# Patient Record
Sex: MALE | Race: WHITE | Employment: FULL TIME | URBAN - NONMETROPOLITAN AREA
[De-identification: names, ages, dates, MRNs, and addresses within clinical notes are randomized per-mention and may not be internally consistent; named-entity substitution may affect disease eponyms.]

---

## 2017-02-22 ENCOUNTER — APPOINTMENT (OUTPATIENT)
Dept: OCCUPATIONAL MEDICINE | Facility: OTHER | Age: 42
End: 2017-02-22

## 2017-02-22 PROCEDURE — 99000 SPECIMEN HANDLING OFFICE-LAB: CPT

## 2018-01-30 ENCOUNTER — HOSPITAL ENCOUNTER (EMERGENCY)
Facility: HOSPITAL | Age: 43
Discharge: HOME | End: 2018-01-30
Attending: PHYSICIAN ASSISTANT | Admitting: PHYSICIAN ASSISTANT
Payer: COMMERCIAL

## 2018-01-30 VITALS
HEART RATE: 83 BPM | SYSTOLIC BLOOD PRESSURE: 131 MMHG | TEMPERATURE: 98.1 F | DIASTOLIC BLOOD PRESSURE: 75 MMHG | OXYGEN SATURATION: 96 % | RESPIRATION RATE: 16 BRPM

## 2018-01-30 DIAGNOSIS — G40.909 NONINTRACTABLE EPILEPSY WITHOUT STATUS EPILEPTICUS, UNSPECIFIED EPILEPSY TYPE (H): ICD-10-CM

## 2018-01-30 LAB
ANION GAP SERPL CALCULATED.3IONS-SCNC: 13 MMOL/L (ref 3–14)
BASOPHILS # BLD AUTO: 0.1 10E9/L (ref 0–0.2)
BASOPHILS NFR BLD AUTO: 0.6 %
BUN SERPL-MCNC: 18 MG/DL (ref 7–30)
CALCIUM SERPL-MCNC: 8.3 MG/DL (ref 8.5–10.1)
CHLORIDE SERPL-SCNC: 102 MMOL/L (ref 94–109)
CO2 SERPL-SCNC: 26 MMOL/L (ref 20–32)
CREAT SERPL-MCNC: 0.78 MG/DL (ref 0.66–1.25)
DIFFERENTIAL METHOD BLD: ABNORMAL
EOSINOPHIL # BLD AUTO: 0 10E9/L (ref 0–0.7)
EOSINOPHIL NFR BLD AUTO: 0.1 %
ERYTHROCYTE [DISTWIDTH] IN BLOOD BY AUTOMATED COUNT: 14.4 % (ref 10–15)
GFR SERPL CREATININE-BSD FRML MDRD: >90 ML/MIN/1.7M2
GLUCOSE SERPL-MCNC: 159 MG/DL (ref 70–99)
HCT VFR BLD AUTO: 37.7 % (ref 40–53)
HGB BLD-MCNC: 13.2 G/DL (ref 13.3–17.7)
IMM GRANULOCYTES # BLD: 0 10E9/L (ref 0–0.4)
IMM GRANULOCYTES NFR BLD: 0.4 %
LYMPHOCYTES # BLD AUTO: 1.3 10E9/L (ref 0.8–5.3)
LYMPHOCYTES NFR BLD AUTO: 13.7 %
MCH RBC QN AUTO: 32.1 PG (ref 26.5–33)
MCHC RBC AUTO-ENTMCNC: 35 G/DL (ref 31.5–36.5)
MCV RBC AUTO: 92 FL (ref 78–100)
MONOCYTES # BLD AUTO: 0.6 10E9/L (ref 0–1.3)
MONOCYTES NFR BLD AUTO: 5.9 %
NEUTROPHILS # BLD AUTO: 7.8 10E9/L (ref 1.6–8.3)
NEUTROPHILS NFR BLD AUTO: 79.3 %
NRBC # BLD AUTO: 0 10*3/UL
NRBC BLD AUTO-RTO: 0 /100
PLATELET # BLD AUTO: 219 10E9/L (ref 150–450)
POTASSIUM SERPL-SCNC: 3.7 MMOL/L (ref 3.4–5.3)
RBC # BLD AUTO: 4.11 10E12/L (ref 4.4–5.9)
SODIUM SERPL-SCNC: 141 MMOL/L (ref 133–144)
WBC # BLD AUTO: 9.8 10E9/L (ref 4–11)

## 2018-01-30 PROCEDURE — 80048 BASIC METABOLIC PNL TOTAL CA: CPT | Performed by: PHYSICIAN ASSISTANT

## 2018-01-30 PROCEDURE — 99284 EMERGENCY DEPT VISIT MOD MDM: CPT | Mod: 25

## 2018-01-30 PROCEDURE — 85025 COMPLETE CBC W/AUTO DIFF WBC: CPT | Performed by: PHYSICIAN ASSISTANT

## 2018-01-30 PROCEDURE — 36415 COLL VENOUS BLD VENIPUNCTURE: CPT | Performed by: PHYSICIAN ASSISTANT

## 2018-01-30 PROCEDURE — 99284 EMERGENCY DEPT VISIT MOD MDM: CPT | Performed by: PHYSICIAN ASSISTANT

## 2018-01-30 PROCEDURE — 25000128 H RX IP 250 OP 636: Performed by: PHYSICIAN ASSISTANT

## 2018-01-30 PROCEDURE — 96374 THER/PROPH/DIAG INJ IV PUSH: CPT

## 2018-01-30 PROCEDURE — 80177 DRUG SCRN QUAN LEVETIRACETAM: CPT

## 2018-01-30 PROCEDURE — 25000132 ZZH RX MED GY IP 250 OP 250 PS 637: Performed by: PHYSICIAN ASSISTANT

## 2018-01-30 RX ORDER — LORAZEPAM 2 MG/ML
1 INJECTION INTRAMUSCULAR ONCE
Status: COMPLETED | OUTPATIENT
Start: 2018-01-30 | End: 2018-01-30

## 2018-01-30 RX ORDER — LEVETIRACETAM 500 MG/1
1000 TABLET ORAL ONCE
Status: COMPLETED | OUTPATIENT
Start: 2018-01-30 | End: 2018-01-30

## 2018-01-30 RX ADMIN — LEVETIRACETAM 1000 MG: 500 TABLET ORAL at 19:20

## 2018-01-30 RX ADMIN — LORAZEPAM 1 MG: 2 INJECTION INTRAMUSCULAR; INTRAVENOUS at 19:22

## 2018-01-30 NOTE — ED AVS SNAPSHOT
[facility]    [address] [address]    Phone:  [phone]                                       Jarett Duenas   MRN: [id number]    Department:  [facility]   Date of Visit:  1/30/2018           After Visit Summary Signature Page     I have received my discharge instructions, and my questions have been answered. I have discussed any challenges I see with this plan with the nurse or doctor.    ..........................................................................................................................................  Patient/Patient Representative Signature      ..........................................................................................................................................  Patient Representative Print Name and Relationship to Patient    ..................................................               ................................................  Date                                            Time    ..........................................................................................................................................  Reviewed by Signature/Title    ...................................................              ..............................................  Date                                                            Time

## 2018-01-30 NOTE — ED AVS SNAPSHOT
[facility]    [address] [address]    Phone:  [phone]                                       Jarett Duenas   MRN: [id number]    Department[facility]  [facility]   Date of Visit:  1/30/2018           Patient Information     Date Of Birth          1975        Your diagnoses for this visit were:     Nonintractable epilepsy without status epilepticus, unspecified epilepsy type (H)        You were seen by Jennifer Matute PA-C.      Follow-up Information     Follow up with Venancio Alejo DO In 2 days.    Specialty:  Family Practice    Contact information:    [facility]  [address] [facility] [address] [phone]          Follow up with [facility].    Specialty:  EMERGENCY MEDICINE    Why:  If symptoms worsen    Contact information:    [address] [address] [phone]    Additional information:    From [place]: Take [address]. Turn left at [address]. Turn left at the first stoplight on [address]. At the first stop sign, take a right onto [address]. Take a left into the parking lot and continue through until you reach the North enterance of the building.       From [place]: Take [address]. Take the [address] ramp towards [place]. Turn left onto [address]. Take a slight right onto [address]. Turn left at the first stoplight on [address]. At the first stop sign, take a right onto [address]. Take a left into the parking lot and continue through until you reach the North enterance of the building.       From [place]: Take [address]. Take a right at [address]. At the first stop sign, take a right onto [address]. Take a left into the parking lot and continue through until you reach the North enterance of the building.         Discharge Instructions       Increase your Keppra to twice daily. Follow up  with Dr. Alejo this week for re-check. Please return here sooner with any new or worsening symptoms.         Discharge Instructions for Epilepsy  You have been diagnosed with epilepsy, a disorder of recurring seizures. When you have a seizure, an electrical disturbance happens in your brain. There are different kinds of seizures, and each patient may have one or many types of seizures. Here are some guidelines for you and your family.  If you have a seizure  Ask friends and family members to learn seizure management. Also, tell them to do the following if you have a seizure:    Clear the area to prevent injury.    Position you on a flat, carpeted surface, if possible.    Don t try to restrain you.    Don t put anything in your mouth.    Turn you onto your side if you start to vomit.    Keep track of the date and time the seizure started, how long it lasted, whether or not you lost consciousness, a description of your body movements, what provoked the seizure (if known), and any injuries you suffered. Using a watch may help keep correct time of events.      Stay with you until you regain consciousness.    Call 911 if the seizure is longer than 5 minutes, if there are multiple seizures, or if you do not begin to wake up after the seizure stops.    Activities  Following are some things to consider:    Enjoy your normal activities. Most people with epilepsy lead normal lives.    Avoid hazardous activities, such as mountain climbing or scuba diving. A seizure under these conditions could lead to a fatal accident.    Do not swim alone or participate in other similar activities without others nearby.    Ask your healthcare provider about any restrictions on driving or other activities.    Check with your state department of public safety to learn whether there are any driving limitations based on your condition.  Other home care  Other considerations:    Take your medicine exactly as directed. Skipping doses can affect  the way your body handles the medicine, which could cause you to have a seizure.    Don t drink alcohol or use any medicine without talking with your healthcare provider first.    Seizure medicines may interact with other medicines. Make sure all of your healthcare providers have a list of all your medicines.     Birth control pills may not work as effectively when taking seizure medicines. Ask your healthcare provider if a change in birth control is needed.     Wear a medical alert pendant or bracelet that alerts others to your condition, especially if you are allergic to seizure medicine.    Join a local support group. Ask your healthcare provider for names and phone numbers.  .  When to seek medical attention  Tell your family members or friends to call 911 right away if you have:    Seizure that lasts more than 5 minutes    Multiple seizures in a row    No recovery of consciousness after the seizure stops  Otherwise, have them call your healthcare provider right away if you have:    Seizures that are getting longer and worse    Seizures that are different from those you ve had in the past    Seizures strong enough to cause injury    Skin rash    Fever of 100.4 F (38 C) or higher, or as directed by your healthcare provider   Date Last Reviewed: 11/5/2015 2000-2017 [employer]. [address] [place] [address]. All rights reserved. This information is not intended as a substitute for professional medical care. Always follow your healthcare professional's instructions.                 Review of your medicines      Our records show that you are taking the medicines listed below. If these are incorrect, please call your family doctor or clinic.        Dose / Directions Last dose taken    ferrous sulfate 325 (65 FE) MG tablet   Commonly known as:  IRON SUPPLEMENT   Dose:  325 mg   Quantity:  100 tablet        Take 1 tablet (325 mg) by mouth 3 times daily (with meals)   Refills:  0         "IBUPROFEN PO        Take by mouth every 6 hours   Refills:  0        KEPPRA PO        Take by mouth daily   Refills:  0                Procedures and tests performed during your visit     Basic metabolic panel    CBC with platelets differential    Keppra (Levetiracetam) Level    Peripheral IV catheter      Orders Needing Specimen Collection     None      Pending Results     Date and Time Order Name Status Description    2018 1906 Keppra (Levetiracetam) Level In process             Pending Culture Results     No orders found from 2018 to 2018.            Thank you for choosing [facility]       Thank you for choosing [facility] for your care. Our goal is always to provide you with excellent care. Hearing back from our patients is one way we can continue to improve our services. Please take a few minutes to complete the written survey that you may receive in the mail after you visit with us. Thank you!        [employer] Information     [employer] lets you send messages to your doctor, view your test results, renew your prescriptions, schedule appointments and more. To sign up, go to [email or web address] . Click on \"Log in\" on the left side of the screen, which will take you to the Welcome page. Then click on \"Sign up Now\" on the right side of the page.     You will be asked to enter the access code listed below, as well as some personal information. Please follow the directions to create your username and password.     Your access code is: [email or web address]  Expires: 2018  8:42 PM     Your access code will  in 90 days. If you need help or a new code, please call your [facility] clinic or [phone].        Care EveryWhere ID     This is your Care EveryWhere ID. This could be used by other organizations to access your [facility] medical records  [id number]        Equal Access to Services     JEANNETTE GRAHAM AH: lula Haque, tram brown " margo andrade ah. So [facility] [phone].    ATENCIÓN: Si habla español, tiene a palomares disposición servicios gratuitos de asistencia lingüística. Llame al [phone].    We comply with applicable federal civil rights laws and [place] laws. We do not discriminate on the basis of race, color, national origin, age, disability, sex, sexual orientation, or gender identity.            After Visit Summary       This is your record. Keep this with you and show to your community pharmacist(s) and doctor(s) at your next visit.

## 2018-01-31 NOTE — ED PROVIDER NOTES
History     Chief Complaint   Patient presents with     Seizures     HPI  Jarett Duenas is a 42 year old male with h/o seizures who presents for evaluation following a seizure while at work. He recalls waking up on the ground and assumed he had a seizure as he couldn't recall how he ended up on the floor. Denies any injuries or loss or bowel/bladder. He has been on Keppra since his first seizure last summer. His dose was recently decreased to once per day by his PCP as he was having vivid dreams. He is unsure what dose he is taking currently. He forgot to take it this morning. He had a head CT last year following as seizure which was normal.     Problem List:    Patient Active Problem List    Diagnosis Date Noted     CARDIOVASCULAR SCREENING; LDL GOAL LESS THAN 160 10/31/2010     Priority: Medium     Sprain of thoracic region 01/02/2006     Priority: Medium        Past Medical History:    Past Medical History:   Diagnosis Date     NO ACTIVE PROBLEMS        Past Surgical History:    No past surgical history on file.    Family History:    No family history on file.    Social History:  Marital Status:  Single [1]  Social History   Substance Use Topics     Smoking status: Current Every Day Smoker     Packs/day: 0.50     Years: 17.00     Types: Cigarettes     Smokeless tobacco: Not on file     Alcohol use Yes      Comment: occasional        Medications:      LevETIRAcetam (KEPPRA PO)   ferrous sulfate (IRON SUPPLEMENT) 325 (65 FE) MG tablet   IBUPROFEN PO         Review of Systems   All other systems reviewed and are negative.      Physical Exam   BP: 156/93  Pulse: 83  Heart Rate: 80  Temp: 97.5  F (36.4  C)  Resp: 16  SpO2: 98 %      Physical Exam   Constitutional: He is oriented to person, place, and time. He appears well-developed and well-nourished.  Non-toxic appearance. He does not have a sickly appearance. He does not appear ill. No distress.   HENT:   Head: Normocephalic and atraumatic.   Right Ear:  Hearing, tympanic membrane, external ear and ear canal normal.   Left Ear: Hearing, tympanic membrane, external ear and ear canal normal.   Nose: Nose normal. Right sinus exhibits no maxillary sinus tenderness and no frontal sinus tenderness. Left sinus exhibits no maxillary sinus tenderness and no frontal sinus tenderness.   Mouth/Throat: Uvula is midline, oropharynx is clear and moist and mucous membranes are normal. No oropharyngeal exudate.   Eyes: Conjunctivae and EOM are normal. Pupils are equal, round, and reactive to light. Right eye exhibits no discharge. Left eye exhibits no discharge. No scleral icterus.   Fundoscopic exam:       The right eye shows no papilledema.        The left eye shows no papilledema.   Neck: Trachea normal, normal range of motion and full passive range of motion without pain. Neck supple. No JVD present. No Brudzinski's sign and no Kernig's sign noted.   Cardiovascular: Normal rate, regular rhythm, normal heart sounds and intact distal pulses.  Exam reveals no gallop and no friction rub.    No murmur heard.  Pulmonary/Chest: Effort normal and breath sounds normal. No respiratory distress. He has no wheezes. He has no rales.   Abdominal: Soft. Bowel sounds are normal.   Musculoskeletal: Normal range of motion. He exhibits no edema.   Lymphadenopathy:     He has no cervical adenopathy.   Neurological: He is alert and oriented to person, place, and time. He has normal strength and normal reflexes. He displays no atrophy and no tremor. No cranial nerve deficit or sensory deficit. He exhibits normal muscle tone. He displays a negative Romberg sign. He displays no seizure activity. Coordination and gait normal. GCS eye subscore is 4. GCS verbal subscore is 5. GCS motor subscore is 6.   Skin: Skin is warm and dry. No rash noted. He is not diaphoretic.   Psychiatric: He has a normal mood and affect. His behavior is normal. Judgment and thought content normal.   Nursing note and vitals  reviewed.      ED Course     ED Course     Procedures           Labs Ordered and Resulted from Time of ED Arrival Up to the Time of Departure from the ED   CBC WITH PLATELETS DIFFERENTIAL - Abnormal; Notable for the following:        Result Value    RBC Count 4.11 (*)     Hemoglobin 13.2 (*)     Hematocrit 37.7 (*)     All other components within normal limits   BASIC METABOLIC PANEL - Abnormal; Notable for the following:     Glucose 159 (*)     Calcium 8.3 (*)     All other components within normal limits   KEPPRA (LEVETIRACETAM) LEVEL   PERIPHERAL IV CATHETER       Assessments & Plan (with Medical Decision Making)   Jarett presents following a presumed seizure. Neuro exam WNL here. Ativan 1mg IV given for seizure prophylaxis. Keppra 1,000mg PO given as he is likely subtherapeutic. Keppra level is a send out. Had prior head CT which was normal last year. Labs largely unremarkable. I suggested he increase his Keppra to twice daily for now and f/u with PCP for re-check this week. He was discharged home in good condition following.       Medications   LORazepam (ATIVAN) injection 1 mg (1 mg Intravenous Given 1/30/18 1922)   levETIRAcetam (KEPPRA) tablet 1,000 mg (1,000 mg Oral Given 1/30/18 1920)         Plan: Increase your Keppra to twice daily. Follow up with Dr. Alejo this week for re-check. Please return here sooner with any new or worsening symptoms.     I have reviewed the nursing notes.    I have reviewed the findings, diagnosis, plan and need for follow up with the patient.    New Prescriptions    No medications on file       Final diagnoses:   Nonintractable epilepsy without status epilepticus, unspecified epilepsy type (H)       1/30/2018   [facility]     Jennifer Matute PA-C  01/30/18 1003

## 2018-01-31 NOTE — ED NOTES
"Provider at the bedside. The patient presents with report of a seizure 30 minutes ago while at work at [employer]. A co-worker dropped him off, the patient came to and found himself on the floor in the break room. He states a 3 year seizure history, last seizure 3 months ago. He states he was changed from Keppra twice daily to daily a couple of years ago, and he forgot to take his Keppra today. He does not know the dose or if it is regular or extended release, prescriptions filled at [facility] which is closed. He states his last seizure was 3 months ago, and he usually has a headache \"like a migraine\" as a warning sign but didn't have that this time, but states a slight headache now. States he was a little groggy when he first came to but feels clear now. Gait and balance steady on arrival. Seizure pads in place on both side rails..  "

## 2018-01-31 NOTE — DISCHARGE INSTRUCTIONS
Increase your Keppra to twice daily. Follow up with Dr. Alejo this week for re-check. Please return here sooner with any new or worsening symptoms.         Discharge Instructions for Epilepsy  You have been diagnosed with epilepsy, a disorder of recurring seizures. When you have a seizure, an electrical disturbance happens in your brain. There are different kinds of seizures, and each patient may have one or many types of seizures. Here are some guidelines for you and your family.  If you have a seizure  Ask friends and family members to learn seizure management. Also, tell them to do the following if you have a seizure:    Clear the area to prevent injury.    Position you on a flat, carpeted surface, if possible.    Don t try to restrain you.    Don t put anything in your mouth.    Turn you onto your side if you start to vomit.    Keep track of the date and time the seizure started, how long it lasted, whether or not you lost consciousness, a description of your body movements, what provoked the seizure (if known), and any injuries you suffered. Using a watch may help keep correct time of events.      Stay with you until you regain consciousness.    Call 911 if the seizure is longer than 5 minutes, if there are multiple seizures, or if you do not begin to wake up after the seizure stops.    Activities  Following are some things to consider:    Enjoy your normal activities. Most people with epilepsy lead normal lives.    Avoid hazardous activities, such as mountain climbing or scuba diving. A seizure under these conditions could lead to a fatal accident.    Do not swim alone or participate in other similar activities without others nearby.    Ask your healthcare provider about any restrictions on driving or other activities.    Check with your state department of public safety to learn whether there are any driving limitations based on your condition.  Other home care  Other considerations:    Take your medicine  exactly as directed. Skipping doses can affect the way your body handles the medicine, which could cause you to have a seizure.    Don t drink alcohol or use any medicine without talking with your healthcare provider first.    Seizure medicines may interact with other medicines. Make sure all of your healthcare providers have a list of all your medicines.     Birth control pills may not work as effectively when taking seizure medicines. Ask your healthcare provider if a change in birth control is needed.     Wear a medical alert pendant or bracelet that alerts others to your condition, especially if you are allergic to seizure medicine.    Join a local support group. Ask your healthcare provider for names and phone numbers.  .  When to seek medical attention  Tell your family members or friends to call 911 right away if you have:    Seizure that lasts more than 5 minutes    Multiple seizures in a row    No recovery of consciousness after the seizure stops  Otherwise, have them call your healthcare provider right away if you have:    Seizures that are getting longer and worse    Seizures that are different from those you ve had in the past    Seizures strong enough to cause injury    Skin rash    Fever of 100.4 F (38 C) or higher, or as directed by your healthcare provider   Date Last Reviewed: 11/5/2015 2000-2017 [employer]. [address] [place] [address], [place] [address]. All rights reserved. This information is not intended as a substitute for professional medical care. Always follow your healthcare professional's instructions.

## 2018-01-31 NOTE — ED NOTES
"Pt stated he was at work getting his meal ready \"and the next thing I knew I was waking up in the floor\". Pt denies pain \"usually I have muscle pains but this time I don't\". States he has a headache \"usually I get them before the seizure but this time I got it after the seizure\". States happened around 1810.  "

## 2018-02-02 LAB — LEVETIRACETAM SERPL-MCNC: 5 UG/ML (ref 12–46)

## 2018-02-02 NOTE — PROGRESS NOTES
Keppra Level: 5 (L). Patient instructed to FU with Dr. Alejo in clinic this week. These results with brief summary of patient visit in ED faxed to Dr. Alejo at [phone] at 0730 this morning.

## 2018-04-12 ENCOUNTER — HOSPITAL ENCOUNTER (EMERGENCY)
Facility: HOSPITAL | Age: 43
Discharge: HOME | End: 2018-04-12
Attending: NURSE PRACTITIONER | Admitting: NURSE PRACTITIONER
Payer: COMMERCIAL

## 2018-04-12 ENCOUNTER — APPOINTMENT (OUTPATIENT)
Dept: GENERAL RADIOLOGY | Facility: HOSPITAL | Age: 43
End: 2018-04-12
Attending: NURSE PRACTITIONER
Payer: COMMERCIAL

## 2018-04-12 VITALS
TEMPERATURE: 97.2 F | RESPIRATION RATE: 16 BRPM | OXYGEN SATURATION: 98 % | DIASTOLIC BLOOD PRESSURE: 95 MMHG | SYSTOLIC BLOOD PRESSURE: 150 MMHG

## 2018-04-12 DIAGNOSIS — S39.012A STRAIN OF LUMBAR REGION, INITIAL ENCOUNTER: ICD-10-CM

## 2018-04-12 DIAGNOSIS — M54.16 LUMBAR RADICULOPATHY: ICD-10-CM

## 2018-04-12 PROCEDURE — 25000132 ZZH RX MED GY IP 250 OP 250 PS 637: Performed by: NURSE PRACTITIONER

## 2018-04-12 PROCEDURE — G0463 HOSPITAL OUTPT CLINIC VISIT: HCPCS | Mod: 25

## 2018-04-12 PROCEDURE — 99203 OFFICE O/P NEW LOW 30 MIN: CPT | Performed by: NURSE PRACTITIONER

## 2018-04-12 PROCEDURE — 96372 THER/PROPH/DIAG INJ SC/IM: CPT

## 2018-04-12 PROCEDURE — 72100 X-RAY EXAM L-S SPINE 2/3 VWS: CPT | Mod: TC

## 2018-04-12 PROCEDURE — 25000128 H RX IP 250 OP 636: Performed by: NURSE PRACTITIONER

## 2018-04-12 RX ORDER — KETOROLAC TROMETHAMINE 30 MG/ML
60 INJECTION, SOLUTION INTRAMUSCULAR; INTRAVENOUS ONCE
Status: COMPLETED | OUTPATIENT
Start: 2018-04-12 | End: 2018-04-12

## 2018-04-12 RX ORDER — CYCLOBENZAPRINE HCL 10 MG
10 TABLET ORAL 3 TIMES DAILY PRN
Qty: 20 TABLET | Refills: 0 | Status: SHIPPED | OUTPATIENT
Start: 2018-04-12 | End: 2019-05-25

## 2018-04-12 RX ORDER — DIAZEPAM 5 MG
5 TABLET ORAL ONCE
Status: COMPLETED | OUTPATIENT
Start: 2018-04-12 | End: 2018-04-12

## 2018-04-12 RX ADMIN — DIAZEPAM 5 MG: 5 TABLET ORAL at 16:14

## 2018-04-12 RX ADMIN — KETOROLAC TROMETHAMINE 60 MG: 30 INJECTION, SOLUTION INTRAMUSCULAR at 16:14

## 2018-04-12 ASSESSMENT — ENCOUNTER SYMPTOMS
PSYCHIATRIC NEGATIVE: 1
VOMITING: 0
WEAKNESS: 0
CHILLS: 0
BACK PAIN: 1
NUMBNESS: 0
ABDOMINAL PAIN: 0
ACTIVITY CHANGE: 1
COUGH: 0
NAUSEA: 0
TROUBLE SWALLOWING: 0
DYSURIA: 0
DIZZINESS: 0
DIARRHEA: 0
APPETITE CHANGE: 0
FEVER: 0

## 2018-04-12 NOTE — ED AVS SNAPSHOT
[facility]    [address] [address]    Phone:  [phone]                                       Jarett Duenas   MRN: [id number]    Department:  [facility]   Date of Visit:  4/12/2018           After Visit Summary Signature Page     I have received my discharge instructions, and my questions have been answered. I have discussed any challenges I see with this plan with the nurse or doctor.    ..........................................................................................................................................  Patient/Patient Representative Signature      ..........................................................................................................................................  Patient Representative Print Name and Relationship to Patient    ..................................................               ................................................  Date                                            Time    ..........................................................................................................................................  Reviewed by Signature/Title    ...................................................              ..............................................  Date                                                            Time

## 2018-04-12 NOTE — LETTER
[facility]  [address] [address]  Phone: [phone]    April 12, 2018        Jarett Duenas  [address] [facility] [address]          To whom it may concern:    RE: Jarett Duenas    Patient was seen and treated today at our clinic.    Please excuse from work on 4-12-18 & 4-13-18.       Sincerely,        MAI Huertas  4/12/2018  4:16 PM  [facility]

## 2018-04-12 NOTE — ED AVS SNAPSHOT
[facility]    [address] [address]    Phone:  [phone]                                       Jarett Duenas   MRN: [id number]    Department:  [facility]   Date of Visit:  4/12/2018           Patient Information     Date Of Birth          1975        Your diagnoses for this visit were:     Strain of lumbar region, initial encounter     Lumbar radiculopathy        You were seen by Mayra Lugo, NP.      Follow-up Information     Follow up with Venancio Alejo DO In 10 days.    Specialty:  Family Practice    Why:  For re-evaluation.     Contact information:    [facility]  [address] [facility] [address] [phone]          Follow up with [facility].    Specialty:  EMERGENCY MEDICINE    Why:  As needed, If symptoms worsen    Contact information:    [address] [address] [phone]    Additional information:    From [facility]: Take [address]. Turn left at [address]. Turn left at the first stoplight on [address]. At the first stop sign, take a right onto [address]. Take a left into the parking lot and continue through until you reach the North enterance of the building.       From [place]: Take [address]. Take the [address] ramp towards [place]. Turn left onto [address]. Take a slight right onto [address]. Turn left at the first stoplight on [address]. At the first stop sign, take a right onto [address]. Take a left into the parking lot and continue through until you reach the North enterance of the building.       From [place]: Take [address]. Take a right at [address]. At the first stop sign, take a right onto [address]. Take a left into the parking lot and continue through until you reach the North enterance of the building.         Discharge Instructions       Take tylenol 1000 mg  every 6 hours and ibuprofen 800 mg every 8 hours as needed for pain.   Take Flexeril as needed as directed. Do not drive or participate in activities that require alertness when taking.   Apply ice and or heat to back.   Work note.   Follow up with PCP in 10 days if pain continues.   Return to urgent care or emergency department with any increase in symptoms or concerns.     Discharge References/Attachments     BACK SPRAIN/STRAIN (ENGLISH)    BACK PAIN W/ SCIATICA (ENGLISH)         Review of your medicines      START taking        Dose / Directions Last dose taken    cyclobenzaprine 10 MG tablet   Commonly known as:  FLEXERIL   Dose:  10 mg   Quantity:  20 tablet        Take 1 tablet (10 mg) by mouth 3 times daily as needed for muscle spasms   Refills:  0          Our records show that you are taking the medicines listed below. If these are incorrect, please call your family doctor or clinic.        Dose / Directions Last dose taken    ferrous sulfate 325 (65 Fe) MG tablet   Commonly known as:  IRON SUPPLEMENT   Dose:  325 mg   Quantity:  100 tablet        Take 1 tablet (325 mg) by mouth 3 times daily (with meals)   Refills:  0        IBUPROFEN PO        Take by mouth every 6 hours   Refills:  0        KEPPRA PO        Take by mouth daily   Refills:  0                Prescriptions were sent or printed at these locations (1 Prescription)                   [facility] - [address]   [address] [place] [address]    Telephone:  [phone]   Fax:  [phone]   Hours:                  E-Prescribed (1 of 1)         cyclobenzaprine (FLEXERIL) 10 MG tablet                Procedures and tests performed during your visit     Lumbar spine XR, 2-3 views      Orders Needing Specimen Collection     None      Pending Results     No orders found from 4/10/2018 to 4/13/2018.            Pending Culture Results     No orders found from 4/10/2018 to 4/13/2018.            Thank you for choosing  "[facility]       Thank you for choosing [facility] for your care. Our goal is always to provide you with excellent care. Hearing back from our patients is one way we can continue to improve our services. Please take a few minutes to complete the written survey that you may receive in the mail after you visit with us. Thank you!        [facility] Information     [facility] lets you send messages to your doctor, view your test results, renew your prescriptions, schedule appointments and more. To sign up, go to [email or web address] . Click on \"Log in\" on the left side of the screen, which will take you to the Welcome page. Then click on \"Sign up Now\" on the right side of the page.     You will be asked to enter the access code listed below, as well as some personal information. Please follow the directions to create your username and password.     Your access code is: [email or web address]  Expires: 2018  9:42 PM     Your access code will  in 90 days. If you need help or a new code, please call your [facility] clinic or [phone].        Care EveryWhere ID     This is your Care EveryWhere ID. This could be used by other organizations to access your [facility] medical records  [id number]        Equal Access to Services     JEANNETTE GRAHAM : Franco Perla, wamaria luzda shine, qaybta kaalmada adelisbet, tram bennett. So [facility] [phone].    ATENCIÓN: Si habla español, tiene a palomares disposición servicios gratuitos de asistencia lingüística. Llame al [phone].    We comply with applicable federal civil rights laws and [place] laws. We do not discriminate on the basis of race, color, national origin, age, disability, sex, sexual orientation, or gender identity.            After Visit Summary       This is your record. Keep this with you and show to your community pharmacist(s) and doctor(s) at your next visit.                  "

## 2018-04-12 NOTE — DISCHARGE INSTRUCTIONS
Take tylenol 1000 mg every 6 hours and ibuprofen 800 mg every 8 hours as needed for pain.   Take Flexeril as needed as directed. Do not drive or participate in activities that require alertness when taking.   Apply ice and or heat to back.   Work note.   Follow up with PCP in 10 days if pain continues.   Return to urgent care or emergency department with any increase in symptoms or concerns.

## 2018-06-11 ENCOUNTER — APPOINTMENT (OUTPATIENT)
Dept: OCCUPATIONAL MEDICINE | Facility: OTHER | Age: 43
End: 2018-06-11

## 2018-06-11 PROCEDURE — 99000 SPECIMEN HANDLING OFFICE-LAB: CPT

## 2019-02-14 ENCOUNTER — HOSPITAL ENCOUNTER (EMERGENCY)
Facility: HOSPITAL | Age: 44
Discharge: HOME | End: 2019-02-14
Attending: INTERNAL MEDICINE | Admitting: INTERNAL MEDICINE
Payer: COMMERCIAL

## 2019-02-14 VITALS
SYSTOLIC BLOOD PRESSURE: 139 MMHG | WEIGHT: 170 LBS | DIASTOLIC BLOOD PRESSURE: 78 MMHG | OXYGEN SATURATION: 96 % | RESPIRATION RATE: 16 BRPM | TEMPERATURE: 98.6 F

## 2019-02-14 DIAGNOSIS — G40.919 BREAKTHROUGH SEIZURE (H): ICD-10-CM

## 2019-02-14 LAB
ALBUMIN SERPL-MCNC: 3.9 G/DL (ref 3.4–5)
ALP SERPL-CCNC: 58 U/L (ref 40–150)
ALT SERPL W P-5'-P-CCNC: 36 U/L (ref 0–70)
ANION GAP SERPL CALCULATED.3IONS-SCNC: 10 MMOL/L (ref 3–14)
AST SERPL W P-5'-P-CCNC: 52 U/L (ref 0–45)
BASOPHILS # BLD AUTO: 0.1 10E9/L (ref 0–0.2)
BASOPHILS NFR BLD AUTO: 0.7 %
BILIRUB SERPL-MCNC: 1 MG/DL (ref 0.2–1.3)
BUN SERPL-MCNC: 18 MG/DL (ref 7–30)
CALCIUM SERPL-MCNC: 8.7 MG/DL (ref 8.5–10.1)
CHLORIDE SERPL-SCNC: 97 MMOL/L (ref 94–109)
CK SERPL-CCNC: 359 U/L (ref 30–300)
CO2 SERPL-SCNC: 25 MMOL/L (ref 20–32)
CREAT SERPL-MCNC: 0.94 MG/DL (ref 0.66–1.25)
DIFFERENTIAL METHOD BLD: NORMAL
EOSINOPHIL # BLD AUTO: 0.3 10E9/L (ref 0–0.7)
EOSINOPHIL NFR BLD AUTO: 3.4 %
ERYTHROCYTE [DISTWIDTH] IN BLOOD BY AUTOMATED COUNT: 13.2 % (ref 10–15)
ETHANOL SERPL-MCNC: <0.01 G/DL
GFR SERPL CREATININE-BSD FRML MDRD: >90 ML/MIN/{1.73_M2}
GLUCOSE SERPL-MCNC: 130 MG/DL (ref 70–99)
HCT VFR BLD AUTO: 42.9 % (ref 40–53)
HGB BLD-MCNC: 15.3 G/DL (ref 13.3–17.7)
IMM GRANULOCYTES # BLD: 0 10E9/L (ref 0–0.4)
IMM GRANULOCYTES NFR BLD: 0.2 %
LACTATE BLD-SCNC: 2.6 MMOL/L (ref 0.7–2)
LYMPHOCYTES # BLD AUTO: 1.7 10E9/L (ref 0.8–5.3)
LYMPHOCYTES NFR BLD AUTO: 16.5 %
MCH RBC QN AUTO: 32.4 PG (ref 26.5–33)
MCHC RBC AUTO-ENTMCNC: 35.7 G/DL (ref 31.5–36.5)
MCV RBC AUTO: 91 FL (ref 78–100)
MONOCYTES # BLD AUTO: 0.8 10E9/L (ref 0–1.3)
MONOCYTES NFR BLD AUTO: 7.9 %
NEUTROPHILS # BLD AUTO: 7.1 10E9/L (ref 1.6–8.3)
NEUTROPHILS NFR BLD AUTO: 71.3 %
NRBC # BLD AUTO: 0 10*3/UL
NRBC BLD AUTO-RTO: 0 /100
PLATELET # BLD AUTO: 204 10E9/L (ref 150–450)
POTASSIUM SERPL-SCNC: 3.6 MMOL/L (ref 3.4–5.3)
PROT SERPL-MCNC: 6.9 G/DL (ref 6.8–8.8)
RBC # BLD AUTO: 4.72 10E12/L (ref 4.4–5.9)
SODIUM SERPL-SCNC: 132 MMOL/L (ref 133–144)
WBC # BLD AUTO: 10 10E9/L (ref 4–11)

## 2019-02-14 PROCEDURE — 80053 COMPREHEN METABOLIC PANEL: CPT | Performed by: INTERNAL MEDICINE

## 2019-02-14 PROCEDURE — 80177 DRUG SCRN QUAN LEVETIRACETAM: CPT

## 2019-02-14 PROCEDURE — 83605 ASSAY OF LACTIC ACID: CPT | Performed by: INTERNAL MEDICINE

## 2019-02-14 PROCEDURE — 80320 DRUG SCREEN QUANTALCOHOLS: CPT | Performed by: INTERNAL MEDICINE

## 2019-02-14 PROCEDURE — 99284 EMERGENCY DEPT VISIT MOD MDM: CPT | Mod: Z6 | Performed by: INTERNAL MEDICINE

## 2019-02-14 PROCEDURE — 99284 EMERGENCY DEPT VISIT MOD MDM: CPT | Mod: 25

## 2019-02-14 PROCEDURE — 36415 COLL VENOUS BLD VENIPUNCTURE: CPT | Performed by: INTERNAL MEDICINE

## 2019-02-14 PROCEDURE — 93005 ELECTROCARDIOGRAM TRACING: CPT

## 2019-02-14 PROCEDURE — 96374 THER/PROPH/DIAG INJ IV PUSH: CPT

## 2019-02-14 PROCEDURE — 82550 ASSAY OF CK (CPK): CPT | Performed by: INTERNAL MEDICINE

## 2019-02-14 PROCEDURE — 25800030 ZZH RX IP 258 OP 636

## 2019-02-14 PROCEDURE — 25000128 H RX IP 250 OP 636

## 2019-02-14 PROCEDURE — 93010 ELECTROCARDIOGRAM REPORT: CPT | Performed by: INTERNAL MEDICINE

## 2019-02-14 PROCEDURE — 85025 COMPLETE CBC W/AUTO DIFF WBC: CPT | Performed by: INTERNAL MEDICINE

## 2019-02-14 RX ORDER — SODIUM CHLORIDE 9 MG/ML
INJECTION, SOLUTION INTRAVENOUS
Status: COMPLETED
Start: 2019-02-14 | End: 2019-02-14

## 2019-02-14 RX ORDER — LEVETIRACETAM 500 MG/5ML
INJECTION, SOLUTION, CONCENTRATE INTRAVENOUS
Status: COMPLETED
Start: 2019-02-14 | End: 2019-02-14

## 2019-02-14 RX ORDER — LEVETIRACETAM 500 MG/1
1000 TABLET ORAL ONCE
Status: DISCONTINUED | OUTPATIENT
Start: 2019-02-14 | End: 2019-02-14 | Stop reason: ALTCHOICE

## 2019-02-14 RX ADMIN — LEVETIRACETAM 1000 MG: 100 INJECTION, SOLUTION INTRAVENOUS at 04:23

## 2019-02-14 RX ADMIN — SODIUM CHLORIDE 100 ML: 9 INJECTION, SOLUTION INTRAVENOUS at 04:23

## 2019-02-14 NOTE — ED NOTES
"Telephone call received from pt's S.OWilmer Chavez. Pt stated is is OK to share information with Kathy. Kathy stated that pt has been, \"drinking heavily for the past 4 days, probably more than a quart per day of hard liquor, and I am not sure if he has been taking his Keppra. He has done this in the past and ended up having a seizure then too.\" Dr Clark notified of this.   "

## 2019-02-14 NOTE — ED NOTES
DATE:  2/14/2019   TIME OF RECEIPT FROM LAB:  0308  LAB TEST:  Lactic acid  LAB VALUE: 2.6  RESULTS GIVEN WITH READ-BACK TO (PROVIDER):  Dr Clark  TIME LAB VALUE REPORTED TO PROVIDER:  030

## 2019-02-14 NOTE — ED AVS SNAPSHOT
[facility]  [address] [address]  Phone:  [phone]                                    Jarett Duenas   MRN: [id number]    Department:  [facility]   Date of Visit:  2/14/2019           After Visit Summary Signature Page    I have received my discharge instructions, and my questions have been answered. I have discussed any challenges I see with this plan with the nurse or doctor.    ..........................................................................................................................................  Patient/Patient Representative Signature      ..........................................................................................................................................  Patient Representative Print Name and Relationship to Patient    ..................................................               ................................................  Date                                   Time    ..........................................................................................................................................  Reviewed by Signature/Title    ...................................................              ..............................................  Date                                               Time          22EPIC Rev 08/18

## 2019-02-16 LAB — LEVETIRACETAM SERPL-MCNC: <2 UG/ML (ref 12–46)

## 2019-02-16 ASSESSMENT — ENCOUNTER SYMPTOMS
SLEEP DISTURBANCE: 0
SHORTNESS OF BREATH: 0
ABDOMINAL DISTENTION: 0
ANAL BLEEDING: 0
CHILLS: 0
NUMBNESS: 0
COUGH: 0
HEADACHES: 0
FLANK PAIN: 0
BLOOD IN STOOL: 0
NAUSEA: 0
DIZZINESS: 0
PALPITATIONS: 0
FEVER: 0
VOICE CHANGE: 0
WEAKNESS: 0
CHEST TIGHTNESS: 0
FREQUENCY: 0
NECK PAIN: 0
CONFUSION: 0
DIAPHORESIS: 0
ABDOMINAL PAIN: 0
SEIZURES: 1
LIGHT-HEADEDNESS: 0
DYSURIA: 0
VOMITING: 0
WHEEZING: 0
BACK PAIN: 0
COLOR CHANGE: 0
MYALGIAS: 0

## 2019-02-16 NOTE — ED PROVIDER NOTES
History     Chief Complaint   Patient presents with     Loss of Consciousness     The history is provided by the patient and the spouse.   Seizures   Seizure type:  Grand mal  Initial focality:  None  Postictal symptoms: confusion    Severity:  Mild  Timing:  Once  Context: medical non-compliance          Allergies:  No Known Allergies    Problem List:    Patient Active Problem List    Diagnosis Date Noted     CARDIOVASCULAR SCREENING; LDL GOAL LESS THAN 160 10/31/2010     Priority: Medium     Sprain of thoracic region 01/02/2006     Priority: Medium        Past Medical History:    Past Medical History:   Diagnosis Date     NO ACTIVE PROBLEMS        Past Surgical History:    No past surgical history on file.    Family History:    No family history on file.    Social History:  Marital Status:  Single [1]  Social History     Tobacco Use     Smoking status: Current Every Day Smoker     Packs/day: 0.50     Years: 17.00     Pack years: 8.50     Types: Cigarettes     Smokeless tobacco: Never Used   Substance Use Topics     Alcohol use: Yes     Comment: occasional     Drug use: Yes     Types: Marijuana     Comment: occasional        Medications:      ferrous sulfate (IRON SUPPLEMENT) 325 (65 FE) MG tablet   IBUPROFEN PO   LevETIRAcetam (KEPPRA PO)   cyclobenzaprine (FLEXERIL) 10 MG tablet         Review of Systems   Constitutional: Negative for chills, diaphoresis and fever.   HENT: Negative for voice change.    Eyes: Negative for visual disturbance.   Respiratory: Negative for cough, chest tightness, shortness of breath and wheezing.    Cardiovascular: Negative for chest pain, palpitations and leg swelling.   Gastrointestinal: Negative for abdominal distention, abdominal pain, anal bleeding, blood in stool, nausea and vomiting.   Genitourinary: Negative for decreased urine volume, dysuria, flank pain and frequency.   Musculoskeletal: Negative for back pain, gait problem, myalgias and neck pain.   Skin: Negative for  color change, pallor and rash.   Neurological: Positive for seizures. Negative for dizziness, syncope, weakness, light-headedness, numbness and headaches.   Psychiatric/Behavioral: Negative for confusion, sleep disturbance and suicidal ideas.       Physical Exam   BP: (!) 144/102  Heart Rate: 76  Temp: 98.6  F (37  C)  Resp: 16  Weight: 77.1 kg (170 lb)  SpO2: 93 %      Physical Exam   Constitutional: He is oriented to person, place, and time. He appears well-developed and well-nourished.   HENT:   Head: Normocephalic and atraumatic.   Eyes: Conjunctivae are normal. Pupils are equal, round, and reactive to light.   Neck: Normal range of motion. Neck supple. No JVD present. No tracheal deviation present. No thyromegaly present.   Cardiovascular: Normal rate, regular rhythm, normal heart sounds and intact distal pulses. Exam reveals no gallop.   No murmur heard.  Pulmonary/Chest: Effort normal and breath sounds normal. No stridor. No respiratory distress. He has no wheezes. He has no rales. He exhibits no tenderness.   Abdominal: Soft. Bowel sounds are normal. He exhibits no distension and no mass. There is no tenderness. There is no rebound and no guarding.   Musculoskeletal: Normal range of motion. He exhibits no edema or tenderness.   Lymphadenopathy:     He has no cervical adenopathy.   Neurological: He is alert and oriented to person, place, and time.   Skin: Skin is warm. No rash noted. No erythema. No pallor.   Psychiatric: His behavior is normal.   Nursing note and vitals reviewed.      ED Course        Procedures                 No results found for this or any previous visit (from the past 24 hour(s)).    Medications   levETIRAcetam (KEPPRA) 500 MG/5ML injection (1,000 mg  Given 2/14/19 6976)   sodium chloride 0.9 % infusion (  Stopped 2/14/19 9259)       Assessments & Plan (with Medical Decision Making)   Breakthrough seizure  Pt forgot to take his evening dosage of medication last couple of night ,  keppra,  Denied alcohol abuse, last drink was 5 days ago, and drinks 2-3 times per months and only weekend( girl friend called earlier that he abuse alcohol)  He does not show any sign or symptoms of alcohol withdrawal after observation in ER for 4-5 hrs   Seizure due to medication non compliance  Agreed to take his medication regulary  D C  Home, follow-up with PCP  I have reviewed the nursing notes.    I have reviewed the findings, diagnosis, plan and need for follow up with the patient.         Medication List      There are no discharge medications for this visit.         Final diagnoses:   Breakthrough seizure (H)       2/14/2019   [facility]     Dariel Saldaan MD  02/16/19 0591

## 2019-05-25 ENCOUNTER — HOSPITAL ENCOUNTER (EMERGENCY)
Facility: HOSPITAL | Age: 44
Discharge: HOME | End: 2019-05-25
Attending: NURSE PRACTITIONER | Admitting: NURSE PRACTITIONER
Payer: COMMERCIAL

## 2019-05-25 VITALS
OXYGEN SATURATION: 96 % | TEMPERATURE: 96.4 F | DIASTOLIC BLOOD PRESSURE: 82 MMHG | SYSTOLIC BLOOD PRESSURE: 108 MMHG | RESPIRATION RATE: 20 BRPM

## 2019-05-25 DIAGNOSIS — S29.012A UPPER BACK STRAIN, INITIAL ENCOUNTER: ICD-10-CM

## 2019-05-25 PROCEDURE — G0463 HOSPITAL OUTPT CLINIC VISIT: HCPCS

## 2019-05-25 PROCEDURE — 99213 OFFICE O/P EST LOW 20 MIN: CPT | Mod: Z6 | Performed by: NURSE PRACTITIONER

## 2019-05-25 RX ORDER — CYCLOBENZAPRINE HCL 10 MG
5-10 TABLET ORAL 3 TIMES DAILY PRN
Qty: 10 TABLET | Refills: 0 | Status: SHIPPED | OUTPATIENT
Start: 2019-05-25 | End: 2019-11-29

## 2019-05-25 ASSESSMENT — ENCOUNTER SYMPTOMS
FEVER: 0
NUMBNESS: 0
WEAKNESS: 0
APPETITE CHANGE: 0
CHILLS: 0
BACK PAIN: 1
FATIGUE: 0

## 2019-05-25 NOTE — ED TRIAGE NOTES
Pt presents today alone for c/o back pain from being a  for 20 years and his back is sore and needing a work note so its documented that he is here and needs time off to rest it.

## 2019-05-25 NOTE — ED TRIAGE NOTES
"Patient presents with upper back pain and pain between his shoulder pain; has had previous \"injuries\" patient states he was been lifting a lot of heavy objects and this AM he moved a 5 pound paint bucket and afterwards the pain worsened.  States he did take 800 mg of ibuprofen and is feeling better now.  "

## 2019-05-25 NOTE — LETTER
May 25, 2019      To Whom It May Concern:      Jarett Duenas was seen in our Urgent Care Department today, 05/25/19.  I expect his condition to improve over the next 1-4 days.  He may return to work/school when improved.    Sincerely,        Gogo Russo, NP

## 2019-05-25 NOTE — ED PROVIDER NOTES
History     Chief Complaint   Patient presents with     Back Pain     HPI  Jarett Duenas is a 44 year old male who presents today with a CC of upper back pain between shoulder blades that started yesterday.  He lifted a 5 gallon bucket and felt the twinge, the injury did not happen at work.  He has a history of this similar upper back strain in the past, was a  for many years.      Pain is worsened by certain movement.  Improved by ibuprofen.  + history of back pain.  No history of back surgery.   Has tried ibuprofen, heat, ice.  Pain is 1/10 at this time.    No trauma or falls  No unexpected weight loss  No numbness, tingling, or weakness in extremities  No fever or chills  No recent history of IV drug use  No recent steroid use or immunosuppressant medications  No history of cancer  No bowel or bladder retention or loss of function  No saddle paresthesia  Pain is improved when lying down      Allergies:  No Known Allergies    Problem List:    Patient Active Problem List    Diagnosis Date Noted     CARDIOVASCULAR SCREENING; LDL GOAL LESS THAN 160 10/31/2010     Priority: Medium     Sprain of thoracic region 01/02/2006     Priority: Medium        Past Medical History:    Past Medical History:   Diagnosis Date     NO ACTIVE PROBLEMS        Past Surgical History:    No past surgical history on file.    Family History:    No family history on file.    Social History:  Marital Status:  Single [1]  Social History     Tobacco Use     Smoking status: Current Every Day Smoker     Packs/day: 0.50     Years: 17.00     Pack years: 8.50     Types: Cigarettes     Smokeless tobacco: Never Used   Substance Use Topics     Alcohol use: Yes     Comment: occasional     Drug use: Yes     Types: Marijuana     Comment: occasional        Medications:      cyclobenzaprine (FLEXERIL) 10 MG tablet   ferrous sulfate (IRON SUPPLEMENT) 325 (65 FE) MG tablet   IBUPROFEN PO   LevETIRAcetam (KEPPRA PO)         Review of Systems    Constitutional: Negative for appetite change, chills, fatigue and fever.   Musculoskeletal: Positive for back pain.   Skin: Negative for rash.   Neurological: Negative for weakness and numbness.       Physical Exam   BP: 108/82  Heart Rate: 76  Temp: 96.4  F (35.8  C)  Resp: 20  SpO2: 96 %      Physical Exam   Constitutional: He is oriented to person, place, and time. He appears well-developed. He is cooperative. He does not appear ill.   HENT:   Head: Normocephalic and atraumatic.   Cardiovascular: Normal rate and regular rhythm.   Pulmonary/Chest: Effort normal and breath sounds normal.   Musculoskeletal:        Right shoulder: Normal. He exhibits normal range of motion.        Left shoulder: Normal.        Cervical back: He exhibits normal range of motion and no bony tenderness.        Thoracic back: He exhibits tenderness (muscle bilaterally) and spasm (mild upper thoracic area).        Lumbar back: He exhibits normal range of motion and no bony tenderness.   Neurological: He is alert and oriented to person, place, and time.   Skin: Skin is warm and dry.   Psychiatric: He has a normal mood and affect. His behavior is normal.   Nursing note and vitals reviewed.      ED Course        Procedures      Assessments & Plan (with Medical Decision Making)     I have reviewed the nursing notes.    I have reviewed the findings, diagnosis, plan and need for follow up with the patient.  ASSESSMENT / PLAN:  (S29.012A) Upper back strain, initial encounter  Comment: improved by ibuprofen, mainly here for work documentation   Plan:  Rest, ice/heat for comfort   Continue OTC ibuprofen for pain/inflammation   Will prescribe some flexeril for muscle relaxer   Back stretches and exercises as discussed and per discharge instructions   Chiropractics or PT as warranted   Patient verbally educated and given appropriate education sheets for their diagnoses and has no questions.   Take medications as directed.    Return to ED/UC if  "symptoms increase or concerns develop such as those discussed and listed on the \"When to go the Emergency Room\" portion of your discharge instructions.     Follow up with your Primary Care provider if symptoms do not improve in 3-5 days           Medication List      Started    cyclobenzaprine 10 MG tablet  Commonly known as:  FLEXERIL  5-10 mg, Oral, 3 TIMES DAILY PRN            Final diagnoses:   Upper back strain, initial encounter       5/25/2019   [facility]     Gogo Russo NP  05/26/19 9305    "

## 2019-05-28 NOTE — ED PROVIDER NOTES
"  History     Chief Complaint   Patient presents with     Back Pain     c/o muscular back pain, notes bending over when pain started yesterday     The history is provided by the patient. No  was used.     Jarett Duenas is a 42 year old male who presents with low back pain that started 1 day ago. Pain started after he was leaning over a table yesterday rolling cigarettes. He's taken ibuprofen and used a heating pad with mild effectiveness. He's \"tweaked\" his back prior. Denies fever or night sweats. Positive for chills. Eating and drinking well. Bowel and bladder are working well. Denies incontinence of bowel or bladder. Denies saddle parathesia. He is a tobacco user and smokes 0.5 PPD of cigarettes.       Problem List:    Patient Active Problem List    Diagnosis Date Noted     CARDIOVASCULAR SCREENING; LDL GOAL LESS THAN 160 10/31/2010     Priority: Medium     Sprain of thoracic region 01/02/2006     Priority: Medium        Past Medical History:    Past Medical History:   Diagnosis Date     NO ACTIVE PROBLEMS        Past Surgical History:    History reviewed. No pertinent surgical history.    Family History:    No family history on file.    Social History:  Marital Status:  Single [1]  Social History   Substance Use Topics     Smoking status: Current Every Day Smoker     Packs/day: 0.50     Years: 17.00     Types: Cigarettes     Smokeless tobacco: Never Used     Alcohol use Yes      Comment: occasional        Medications:      cyclobenzaprine (FLEXERIL) 10 MG tablet   LevETIRAcetam (KEPPRA PO)   IBUPROFEN PO   ferrous sulfate (IRON SUPPLEMENT) 325 (65 FE) MG tablet         Review of Systems   Constitutional: Positive for activity change. Negative for appetite change, chills and fever.   HENT: Negative for trouble swallowing.    Respiratory: Negative for cough.    Gastrointestinal: Negative for abdominal pain, diarrhea, nausea and vomiting.   Genitourinary: Negative for dysuria. " F/U with pt who said his hip/leg pain were better when he took the pulse steroids and for two days after his IVIG. He is taking Lyrica BID.      Musculoskeletal: Positive for back pain.        Low back pain with radiation into right mid thigh.    Skin: Negative for rash.   Neurological: Negative for dizziness, weakness and numbness.        Denies numbness or tingling down legs.    Psychiatric/Behavioral: Negative.        Physical Exam   BP: 150/95  Heart Rate: 74  Temp: 97.2  F (36.2  C)  Resp: 16  SpO2: 98 %      Physical Exam   Constitutional: He is oriented to person, place, and time. He appears well-developed and well-nourished. No distress.   HENT:   Head: Normocephalic.   Mouth/Throat: Oropharynx is clear and moist.   Neck: Normal range of motion. Neck supple.   Cardiovascular: Normal rate, regular rhythm, normal heart sounds and intact distal pulses.    Pulmonary/Chest: Effort normal. No respiratory distress. He has no wheezes. He has no rales.   Abdominal: Soft. He exhibits no distension.   Musculoskeletal: He exhibits tenderness. He exhibits no edema or deformity.   CMS and ROM intact to all extremities. Distal pulses intact. Extension and flexion intact to upper torso, but limited with pain. No step offs or TTP to spinal column. Pain IS reproducible to bilateral L2-L3 para spinal muscles. Muscle spasms palpated.    Lymphadenopathy:     He has no cervical adenopathy.   Neurological: He is alert and oriented to person, place, and time. He displays normal reflexes. He exhibits normal muscle tone.   Skin: Skin is warm and dry. No rash noted. He is not diaphoretic. No erythema.   No rash, erythema, bruising, or warmth to the touch to posterior back.    Psychiatric: He has a normal mood and affect. His behavior is normal.   Nursing note and vitals reviewed.      ED Course     ED Course     Procedures    I personally reviewed the x-rays and there is NO fracture or dislocation. Radiology review pending and nurse will notify patient if there is any change in the treatment plan.    Results for orders placed or performed during the hospital encounter of  04/12/18   Lumbar spine XR, 2-3 views    Narrative    Exam: XR LUMBAR SPINE 2-3 VIEWS     History:Male, age 42 years, pain;     Comparison:  None    Technique: Three views are submitted.    Findings: Bones are normally mineralized. No evidence of acute or  subacute fracture. No evidence of acute subluxation. There is mild  straightening the lumbar spine.           Impression    Impression:  1.  No evidence of acute or subacute bony abnormality.     2.  Mild endplate degenerative changes.    3.  Mild lumbar spine straightening suggesting muscle spasm.    SAWYER LUJAN MD     Medications   ketorolac (TORADOL) injection 60 mg (60 mg Intramuscular Given 4/12/18 1614)   diazepam (VALIUM) tablet 5 mg (5 mg Oral Given 4/12/18 1614)     Monitored for 20 minutes and tolerated well. He took a cab here and will take a cab home.     Assessments & Plan (with Medical Decision Making)     Discussed plan of care. He verbalized understanding. All questions answered.     I have reviewed the nursing notes.    I have reviewed the findings, diagnosis, plan and need for follow up with the patient.  Discharged in stable condition.     New Prescriptions    CYCLOBENZAPRINE (FLEXERIL) 10 MG TABLET    Take 1 tablet (10 mg) by mouth 3 times daily as needed for muscle spasms       Final diagnoses:   Strain of lumbar region, initial encounter   Lumbar radiculopathy     Take tylenol 1000 mg every 6 hours and ibuprofen 800 mg every 8 hours as needed for pain.   Take Flexeril as needed as directed. Do not drive or participate in activities that require alertness when taking.   Apply ice and or heat to back.   Work note.   Follow up with PCP in 10 days if pain continues.   Return to urgent care or emergency department with any increase in symptoms or concerns.     MAI Huertas  4/12/2018  3:18 PM  [facility]         Mayra Lugo NP  04/14/18 1929       Mayra Lugo NP  04/14/18 1931

## 2019-11-29 ENCOUNTER — HOSPITAL ENCOUNTER (EMERGENCY)
Facility: HOSPITAL | Age: 44
Discharge: HOME | End: 2019-11-29
Attending: FAMILY MEDICINE | Admitting: FAMILY MEDICINE

## 2019-11-29 VITALS
RESPIRATION RATE: 16 BRPM | DIASTOLIC BLOOD PRESSURE: 77 MMHG | OXYGEN SATURATION: 98 % | SYSTOLIC BLOOD PRESSURE: 128 MMHG | TEMPERATURE: 98.2 F

## 2019-11-29 DIAGNOSIS — L02.413 ABSCESS OF RIGHT ARM: ICD-10-CM

## 2019-11-29 PROCEDURE — 25000125 ZZHC RX 250: Performed by: FAMILY MEDICINE

## 2019-11-29 PROCEDURE — 10060 I&D ABSCESS SIMPLE/SINGLE: CPT | Performed by: FAMILY MEDICINE

## 2019-11-29 PROCEDURE — 87185 SC STD ENZYME DETCJ PER NZM: CPT | Performed by: FAMILY MEDICINE

## 2019-11-29 PROCEDURE — 87070 CULTURE OTHR SPECIMN AEROBIC: CPT | Performed by: FAMILY MEDICINE

## 2019-11-29 PROCEDURE — 96372 THER/PROPH/DIAG INJ SC/IM: CPT | Mod: XU

## 2019-11-29 PROCEDURE — 25000128 H RX IP 250 OP 636: Performed by: FAMILY MEDICINE

## 2019-11-29 PROCEDURE — 10060 I&D ABSCESS SIMPLE/SINGLE: CPT

## 2019-11-29 PROCEDURE — 99284 EMERGENCY DEPT VISIT MOD MDM: CPT | Mod: 25

## 2019-11-29 PROCEDURE — 87077 CULTURE AEROBIC IDENTIFY: CPT | Performed by: FAMILY MEDICINE

## 2019-11-29 RX ORDER — CEFTRIAXONE SODIUM 1 G
1 VIAL (EA) INJECTION ONCE
Status: COMPLETED | OUTPATIENT
Start: 2019-11-29 | End: 2019-11-29

## 2019-11-29 RX ADMIN — LIDOCAINE HYDROCHLORIDE 1 G: 10 INJECTION, SOLUTION EPIDURAL; INFILTRATION; INTRACAUDAL; PERINEURAL at 10:01

## 2019-11-29 ASSESSMENT — ENCOUNTER SYMPTOMS
HEMATOLOGIC/LYMPHATIC NEGATIVE: 1
DIFFICULTY URINATING: 0
COLOR CHANGE: 0
ALLERGIC/IMMUNOLOGIC NEGATIVE: 1
MUSCULOSKELETAL NEGATIVE: 1
WOUND: 1
EYE REDNESS: 0
CONFUSION: 0
GASTROINTESTINAL NEGATIVE: 1
ABDOMINAL PAIN: 0
EYES NEGATIVE: 1
PSYCHIATRIC NEGATIVE: 1
NEUROLOGICAL NEGATIVE: 1
HEADACHES: 0
RESPIRATORY NEGATIVE: 1
CONSTITUTIONAL NEGATIVE: 1
ARTHRALGIAS: 0
FEVER: 0
ENDOCRINE NEGATIVE: 1
CARDIOVASCULAR NEGATIVE: 1
SHORTNESS OF BREATH: 0
NECK STIFFNESS: 0

## 2019-11-29 NOTE — ED TRIAGE NOTES
Pt reports that after being clean for 17 years he used meth on Saturday and shot up with a clean needle , pt believes he poked that area multiple times.  Large wound noted in triage and draining. redness noted also

## 2019-11-29 NOTE — ED NOTES
Discharge instructions reviewed with patient, no questions.  Pt to fill prescription Augmentin at pharmacy. Dressing to RUE CDI.

## 2019-11-29 NOTE — ED NOTES
Please send updated RS for prednisione   Wound measures 1.5cm circular and inside the circular are is an open area that measures 0.5cm circular.  Dressing applied per Dr. Mchugh. Dressed with Telfa with abx ointment and Kerlix wrapped

## 2019-11-29 NOTE — ED PROVIDER NOTES
History     Chief Complaint   Patient presents with     Wound Infection     HPI  Jarett Duenas is a 44 year old male who states that he had been clean for 17 years but attempted to inject methamphetamines 6 days ago in the right antecubital area.  He attempted several times.  He now has a swollen red area.  Last tetanus was 2015 will give a gram of Rocephin IM.  Will make a small incision and evacuate any for purulent material and culture of this.  Obviously will recommend drug use cessation as well as tobacco cessation.    Allergies:  No Known Allergies    Problem List:    Patient Active Problem List    Diagnosis Date Noted     CARDIOVASCULAR SCREENING; LDL GOAL LESS THAN 160 10/31/2010     Priority: Medium     Sprain of thoracic region 01/02/2006     Priority: Medium        Past Medical History:    Past Medical History:   Diagnosis Date     NO ACTIVE PROBLEMS        Past Surgical History:    No past surgical history on file.    Family History:    No family history on file.    Social History:  Marital Status:  Single [1]  Social History     Tobacco Use     Smoking status: Current Every Day Smoker     Packs/day: 0.50     Years: 17.00     Pack years: 8.50     Types: Cigarettes     Smokeless tobacco: Never Used   Substance Use Topics     Alcohol use: Yes     Comment: occasional     Drug use: Yes     Types: Marijuana     Comment: occasional        Medications:    amoxicillin-clavulanate (AUGMENTIN) 875-125 MG tablet  IBUPROFEN PO  LevETIRAcetam (KEPPRA PO)          Review of Systems   Constitutional: Negative.  Negative for fever.   HENT: Negative.  Negative for congestion.    Eyes: Negative.  Negative for redness.   Respiratory: Negative.  Negative for shortness of breath.    Cardiovascular: Negative.  Negative for chest pain.   Gastrointestinal: Negative.  Negative for abdominal pain.   Endocrine: Negative.    Genitourinary: Negative.  Negative for difficulty urinating.   Musculoskeletal: Negative.  Negative  for arthralgias and neck stiffness.   Skin: Positive for wound (Abscess or hematoma right arm antecubital area). Negative for color change.   Allergic/Immunologic: Negative.    Neurological: Negative.  Negative for headaches.   Hematological: Negative.    Psychiatric/Behavioral: Negative.  Negative for confusion.   All other systems reviewed and are negative.      Physical Exam   BP: 138/76  Heart Rate: 83  Temp: 97.9  F (36.6  C)  Resp: 16  SpO2: 100 %      Physical Exam  Vitals signs and nursing note reviewed.   Constitutional:       General: He is not in acute distress.     Appearance: Normal appearance. He is normal weight. He is not ill-appearing, toxic-appearing or diaphoretic.   HENT:      Head: Normocephalic and atraumatic.      Right Ear: Tympanic membrane, ear canal and external ear normal.      Left Ear: Tympanic membrane, ear canal and external ear normal.      Nose: Nose normal. No congestion or rhinorrhea.      Mouth/Throat:      Mouth: Mucous membranes are moist.      Pharynx: Oropharynx is clear. No oropharyngeal exudate or posterior oropharyngeal erythema.   Eyes:      General:         Right eye: No discharge.         Left eye: No discharge.      Extraocular Movements: Extraocular movements intact.      Pupils: Pupils are equal, round, and reactive to light.   Neck:      Musculoskeletal: Normal range of motion and neck supple. No neck rigidity or muscular tenderness.      Vascular: No carotid bruit.   Cardiovascular:      Rate and Rhythm: Normal rate.      Pulses: Normal pulses.   Pulmonary:      Effort: Pulmonary effort is normal.   Abdominal:      Tenderness: There is no abdominal tenderness.   Musculoskeletal: Normal range of motion.         General: No swelling, tenderness, deformity or signs of injury.      Right lower leg: No edema.      Left lower leg: No edema.   Lymphadenopathy:      Cervical: No cervical adenopathy.   Skin:     General: Skin is warm and dry.      Capillary Refill:  Capillary refill takes less than 2 seconds.      Coloration: Skin is not jaundiced or pale.      Findings: No bruising, erythema or rash.      Comments: Abscess versus hematoma right arm antecubital fossa with surrounding induration.  The wound has a central necrotic area of about 1 cm noted   Neurological:      General: No focal deficit present.      Mental Status: He is alert and oriented to person, place, and time. Mental status is at baseline.      Cranial Nerves: No cranial nerve deficit.      Sensory: No sensory deficit.      Motor: No weakness.      Coordination: Coordination normal.      Gait: Gait normal.   Psychiatric:         Mood and Affect: Mood normal.         Behavior: Behavior normal.         Thought Content: Thought content normal.         Judgment: Judgment normal.         ED Course        [facility]    PROCEDURE: -Incision/Drainage  Date/Time: 11/29/2019 9:58 AM  Performed by: Jg Mchugh DO  Authorized by: Jg Mchugh DO       LOCATION:      Type:  Abscess    Location:  Upper extremity    Upper extremity location:  Arm    Arm location:  R upper arm    PRE-PROCEDURE DETAILS:     Skin preparation:  Betadine    ANESTHESIA (see MAR for exact dosages):     Anesthesia method:  None    PROCEDURE TYPE:     Complexity:  Simple    PROCEDURE DETAILS:     Needle aspiration: no      Incision types:  Stab incision    Incision depth:  Subcutaneous    Scalpel blade:  11    Wound management:  Probed and deloculated    Drainage:  Purulent    Drainage amount:  Moderate    Wound treatment:  Wound left open    Packing materials:  None  Post-procedure details:     PROCEDURE   Patient Tolerance:  Patient tolerated the procedure well with no immediate complications  Describe Procedure: Culture taken of the wound, areas cleansed with Betadine, antibiotic ointment and dressing applied.                 Critical Care time:  none               No results found for this or any previous visit  (from the past 24 hour(s)).    Medications   cefTRIAXone (ROCEPHIN) 1 g in lidocaine (PF) (XYLOCAINE) 1 % injection (1 g Intramuscular Given 11/29/19 1001)       Assessments & Plan (with Medical Decision Making)     I have reviewed the nursing notes.    I have reviewed the findings, diagnosis, plan and need for follow up with the patient.   I&D without complications.  He does have a central necrotic area that is a concern for healing.  We will refer him to wound care management for follow-up.  Start him on Augmentin.  Recommend smoking and meth use cessation.  Can soak in warm soapy water.  Return if necessary.    New Prescriptions    AMOXICILLIN-CLAVULANATE (AUGMENTIN) 875-125 MG TABLET    Take 1 tablet by mouth 2 times daily for 10 days       Final diagnoses:   Abscess of right arm       11/29/2019   [facility]     Jg Mchugh,   11/29/19 1007

## 2019-11-29 NOTE — ED AVS SNAPSHOT
[facility]  [address] [address]  Phone:  [phone]                                    Jarett Duenas   MRN: [id number]    Department:  [facility]   Date of Visit:  11/29/2019           After Visit Summary Signature Page    I have received my discharge instructions, and my questions have been answered. I have discussed any challenges I see with this plan with the nurse or doctor.    ..........................................................................................................................................  Patient/Patient Representative Signature      ..........................................................................................................................................  Patient Representative Print Name and Relationship to Patient    ..................................................               ................................................  Date                                   Time    ..........................................................................................................................................  Reviewed by Signature/Title    ...................................................              ..............................................  Date                                               Time          22EPIC Rev 08/18

## 2019-12-03 LAB
BACTERIA SPEC CULT: ABNORMAL
BACTERIA SPEC CULT: ABNORMAL
SPECIMEN SOURCE: ABNORMAL

## 2019-12-06 ENCOUNTER — OFFICE VISIT (OUTPATIENT)
Dept: WOUND CARE | Facility: OTHER | Age: 44
End: 2019-12-06
Attending: FAMILY MEDICINE

## 2019-12-06 VITALS
HEART RATE: 60 BPM | SYSTOLIC BLOOD PRESSURE: 135 MMHG | RESPIRATION RATE: 16 BRPM | TEMPERATURE: 98.2 F | DIASTOLIC BLOOD PRESSURE: 78 MMHG

## 2019-12-06 DIAGNOSIS — Z71.6 TOBACCO ABUSE COUNSELING: ICD-10-CM

## 2019-12-06 DIAGNOSIS — L02.413 ABSCESS OF RIGHT ARM: ICD-10-CM

## 2019-12-06 DIAGNOSIS — S41.101D OPEN ARM WOUND, RIGHT, SUBSEQUENT ENCOUNTER: Primary | ICD-10-CM

## 2019-12-06 DIAGNOSIS — Z72.0 TOBACCO ABUSE: ICD-10-CM

## 2019-12-06 PROCEDURE — 97597 DBRDMT OPN WND 1ST 20 CM/<: CPT | Performed by: NURSE PRACTITIONER

## 2019-12-06 PROCEDURE — 99213 OFFICE O/P EST LOW 20 MIN: CPT | Mod: 25 | Performed by: NURSE PRACTITIONER

## 2019-12-06 ASSESSMENT — PAIN SCALES - GENERAL: PAINLEVEL: NO PAIN (0)

## 2019-12-06 NOTE — PATIENT INSTRUCTIONS
Wash hands prior to dressing change.   Clean instruments as appropriate    Wash wound with mild soap and water, dry  Apply honey alginate to wound base  Cover with foam  Secure with coban (loosely) and tape  Change daily    Please call if any questions/concerns and/or problems ([phone])    Follow up   One week            HOW TO QUIT SMOKING  Smoking is one of the hardest habits to break. About half of all those who have ever smoked have been able to quit, and most of those (about 70%) who still smoke want to quit. Here are some of the best ways to stop smoking.     KEEP TRYING:  It takes most smokers about 8 tries before they are finally able to fully quit. So, the more often you try and fail, the better your chance of quitting the next time! So, don't give up!    GO COLD TURKEY:  Most ex-smokers quit cold turkey. Trying to cut back gradually doesn't seem to work as well, perhaps because it continues the smoking habit. Also, it is possible to fool yourself by inhaling more while smoking fewer cigarettes. This results in the same amount of nicotine in your body!    GET SUPPORT:  Support programs can make an important difference, especially for the heavy smoker. These groups offer lectures, methods to change your behavior and peer support. Call the free national Quitline for more information. 800-QUIT-NOW (666-083-5993). Low-cost or free programs are offered by many hospitals, local chapters of the American Lung Association (516-640-4953) and the American Cancer Society (955-177-7400). Support at home is important too. Non-smokers can help by offering praise and encouragement. If the smoker fails to quit, encourage them to try again!    OVER-THE-COUNTER MEDICINES:  For those who can't quit on their own, Nicotine Replacement Therapy (NRT) may make quitting much easier. Certain aids such as the nicotine patch, gum and lozenge are available without a prescription. However, it is best to use these under the guidance  of your doctor. The skin patch provides a steady supply of nicotine to the body. Nicotine gum and lozenge gives temporary bursts of low levels of nicotine. Both methods take the edge off the craving for cigarettes. WARNING: If you feel symptoms of nicotine overdose, such as nausea, vomiting, dizziness, weakness, or fast heartbeat, stop using these and see your doctor.    PRESCRIPTION MEDICINES:  After evaluating your smoking patterns and prior attempts at quitting, your doctor may offer a prescription medicine such as bupropion (Zyban, Wellbutrin), varenicline (Chantix, Champix), a niocotine inhaler or nasal spray. Each has its unique advantage and side effects which your doctor can review with you.    HEALTH BENEFITS OF QUITTING:  The benefits of quitting start right away and keep improving the longer you go without smokin minutes: blood pressure and pulse return to normal  8 hours: oxygen levels return to normal  2 days: ability to smell and taste begins to improve as damaged nerves start to regrow  2-3 weeks: circulation and lung function improves  1-9 months: decreased cough, congestion and shortness of breath; less tired  1 year: risk of heart attack decreases by half  5 years: risk of lung cancer decreases by half; risk of stroke becomes the same as a non-smoker  For information about how to quit smoking, visit the following links:  National Cancer [place] ,   Clearing the Air, Quit Smoking Today   - an online booklet. http://www.smokefree.gov/pubs/clearing_the_air.pdf  Smokefree.gov http://smokefree.gov/  QuitNet http://www.quitnet.com/    0732-9450 Harjit Starr, [address] [place] [address], [place] [phone]. All rights reserved. This information is not intended as a substitute for professional medical care. Always follow your healthcare professional's instructions.    The Benefits of Living Smoke Free  What do you want to gain from quitting? Check off some reasons to quit.  Health Benefits  ___ Reduce  my risk of lung cancer, heart disease, chronic lung disease  ___ Have fewer wrinkles and softer skin  ___ Improve my sense of taste and smell  ___ For pregnant women--reduce the risk of having a miscarriage, stillbirth, premature birth, or low-birth-weight baby  Personal Benefits  ___ Feel more in control of my life  ___ Have better-smelling hair, breath, clothes, home, and car  ___ Save time by not having to take smoke breaks, buy cigarettes, or hunt for a light  ___ Have whiter teeth  Family Benefits  ___ Reduce my children s respiratory tract infections  ___ Set a good example for my children  ___ Reduce my family s cancer risk  Financial Benefits  ___ Save hundreds of dollars each year that would be spent on cigarettes  ___ Save money on medical bills  ___ Save on life, health, and car insurance premiums    Those Dollars Add Up!  Cigarettes are expensive, and getting more expensive all the time. Do you realize how much money you are spending on cigarettes per year? What is the average amount you spend on a pack of cigarettes? What is the average number of packs that you smoke per day? Using your answers to these questions, fill in this formula to help you find out:  ($ _____ per pack) ×  ( _____ number of packs per day) × (365 days) =  $ _____ yearly cost of smoking  Besides tobacco, there are other costs, including extra cleaning bills and replacement costs for clothing and furniture; medical expenses for smoking-related illnesses; and higher health, life, and car insurance premiums.    Cigars and Pipes Count Too!  Cigars and pipes are also dangerous. So are smokeless (chewing) tobacco and snuff. All of these products contain nicotine, a highly addictive substance that has harmful effects on your body. Quitting smoking means giving up all tobacco products.      7216-1227 Harjit Starr, [address] [facility], [place] [address]. All rights reserved. This information is not intended as a substitute for  professional medical care. Always follow your healthcare professional's instructions.

## 2019-12-06 NOTE — NURSING NOTE
"Chief Complaint   Patient presents with     WOUND CARE       Initial /78 (BP Location: Left arm, Patient Position: Sitting, Cuff Size: Adult Regular)   Pulse 60   Temp 98.2  F (36.8  C) (Tympanic)   Resp 16  Estimated body mass index is 22.59 kg/m  as calculated from the following:    Height as of 1/2/06: 1.803 m (5' 11\").    Weight as of 4/8/10: 73.5 kg (162 lb).  Medication Reconciliation: complete  Virginia Martin LPN  "

## 2019-12-06 NOTE — PROGRESS NOTES
"SUBJECTIVE:  Jarett Duenas, 44 year old, male presents with the following Chief Complaint(s) with HPI to follow:  Chief Complaint   Patient presents with     WOUND CARE        Wound Care    HPI:  Jarett is here today for the reassessment and treatment of right arm, antecubital wound.   Back story:  Recently lost his dad and uncle.   As he was going through some boxes on 11/23 (early Saturday morning) he found an old stash of meth.    As Jarett reports, he had a lack of judgement and shot up. That Sunday he noticed a lump on his right antecubital area.    As he was walking through the doors to the ED (11/29/19), the area \"popped\".    He didn't come sooner due to work and family obligations.    Reports being clean for 17 years, but again made a bad decision.    11/29/19: seeing the in ED.  Tx: I&D. Received Rocephin.  Started on Augmentin.  12/6/19: 1st wound care appt  Current tx: antibiotic ointment and bandaid, changed daily. 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   PMH: current smoker, denies diabetes      Patient Active Problem List   Diagnosis     Sprain of thoracic region     CARDIOVASCULAR SCREENING; LDL GOAL LESS THAN 160       Past Medical History:   Diagnosis Date     NO ACTIVE PROBLEMS        History reviewed. No pertinent surgical history.    History reviewed. No pertinent family history.    Social History     Tobacco Use     Smoking status: Current Every Day Smoker     Packs/day: 0.50     Years: 17.00     Pack years: 8.50     Types: Cigarettes     Smokeless tobacco: Never Used   Substance Use Topics     Alcohol use: Yes     Comment: occasional       Current Outpatient Medications   Medication Sig Dispense Refill     amoxicillin-clavulanate (AUGMENTIN) 875-125 MG tablet Take 1 tablet by mouth 2 times daily for 10 days 20 tablet 0     IBUPROFEN PO Take by mouth every 6 hours       LevETIRAcetam (KEPPRA PO) Take by mouth daily         No " Known Allergies    REVIEW OF SYSTEMS  Skin: noted  Eyes: negative  Ears/Nose/Throat: negative  Respiratory: No shortness of breath, dyspnea on exertion, cough, or hemoptysis  Cardiovascular: negative  Gastrointestinal: negative  Genitourinary: negative  Musculoskeletal: sometimes sore  Neurologic: numb in this area  Psychiatric: recently lost his dad and uncle  Hematologic/Lymphatic/Immunologic: negative  Endocrine: negative    OBJECTIVE:  /78 (BP Location: Left arm, Patient Position: Sitting, Cuff Size: Adult Regular)   Pulse 60   Temp 98.2  F (36.8  C) (Tympanic)   Resp 16   Constitutional: alert and no distress  Musculoskeletal: extremities normal- no gross deformities noted and gait normal  Skin:   Wound description: Type of Wound- trauma/post I&D; Location- right antecubital area, Drainage amount-scant/small, Drainage color-serous, Odor- none; Wound bed-95% tan slough, 5% red; after debridement 50% marbled, Surrounding skin-slight erythema, no lymphangitis or heat.  Measurements: 1 x 1 cm, tunnel @ 1100: 0.5 cm  Dressing change: Wound cleansed with soap and water, dried, debrided, dressed with honey alginate, foam, coban.     Psychiatric: mentation appears normal and affect normal/bright    Washed wound with soap and water.  Dried.    Noted slough   Conservative sharps debridement:  2 patient identifiers used and applied 4% topical lidocaine cream prior to procedure.  Explained risks and benefits of procedure.  Patient consents to continue.    I removed <20 sqcm of nonviable tissue from on top of wound using a sharp, sterile curette and pick ups.    No bleeding, no pain.  Rinsed with normal saline.    Wound care includes as noted above        LABS  No results found for any visits on 12/06/19.    ASSESSMENT / PLAN:  (S41.101D) Open arm wound, right, subsequent encounter  (primary encounter diagnosis)  Comment: noted and plan developed  Plan:   Honey alginate to help soften slough  Cover with  foam  Secure with coban    I did write a work excuse for today due to appointment    (L02.413) Abscess of right arm  Comment: noted  Plan: as discussed above    (Z72.0) Tobacco abuse  Comment: noted and working on it  Plan: Tobacco Cessation - Order to Satisfy Health         Maintenance          Jarett's aware to let us know when he's ready to quit smoking.  Discussed the dangers of smoking and healing wounds    (Z71.6) Tobacco abuse counseling  Comment: note  Plan: Tobacco Cessation - Order to Satisfy Health         Maintenance          As state above    Treatment goal:   Moisture balance  Prevent infection  Promote healting    Patient Instructions   Wash hands prior to dressing change.   Clean instruments as appropriate    Wash wound with mild soap and water, dry  Apply honey alginate to wound base  Cover with foam  Secure with coban (loosely) and tape  Change daily    Please call if any questions/concerns and/or problems ([phone])    Follow up   One week                Time: 30 minutes  Barrier: none  Willingness to learn: accepting    Mehreen AUGUST FNP-BC  Disease Management    Cc: Dr. Alejo

## 2020-04-30 ENCOUNTER — HOSPITAL ENCOUNTER (EMERGENCY)
Facility: HOSPITAL | Age: 45
Discharge: HOME | End: 2020-04-30
Attending: EMERGENCY MEDICINE | Admitting: EMERGENCY MEDICINE
Payer: COMMERCIAL

## 2020-04-30 VITALS
DIASTOLIC BLOOD PRESSURE: 75 MMHG | RESPIRATION RATE: 16 BRPM | SYSTOLIC BLOOD PRESSURE: 130 MMHG | TEMPERATURE: 97.6 F | HEART RATE: 75 BPM | OXYGEN SATURATION: 97 %

## 2020-04-30 DIAGNOSIS — S39.012A STRAIN OF LUMBAR REGION, INITIAL ENCOUNTER: ICD-10-CM

## 2020-04-30 PROCEDURE — 99284 EMERGENCY DEPT VISIT MOD MDM: CPT | Mod: Z6 | Performed by: EMERGENCY MEDICINE

## 2020-04-30 PROCEDURE — 25000128 H RX IP 250 OP 636: Performed by: EMERGENCY MEDICINE

## 2020-04-30 PROCEDURE — 99284 EMERGENCY DEPT VISIT MOD MDM: CPT | Mod: 25

## 2020-04-30 PROCEDURE — 96372 THER/PROPH/DIAG INJ SC/IM: CPT

## 2020-04-30 RX ORDER — KETOROLAC TROMETHAMINE 30 MG/ML
60 INJECTION, SOLUTION INTRAMUSCULAR; INTRAVENOUS ONCE
Status: COMPLETED | OUTPATIENT
Start: 2020-04-30 | End: 2020-04-30

## 2020-04-30 RX ORDER — FERROUS SULFATE 324(65)MG
324 TABLET, DELAYED RELEASE (ENTERIC COATED) ORAL
COMMUNITY
End: 2021-11-24

## 2020-04-30 RX ORDER — CYCLOBENZAPRINE HCL 10 MG
10 TABLET ORAL AT BEDTIME
Qty: 3 TABLET | Refills: 0 | Status: SHIPPED | OUTPATIENT
Start: 2020-04-30 | End: 2020-06-29

## 2020-04-30 RX ADMIN — KETOROLAC TROMETHAMINE 60 MG: 30 INJECTION, SOLUTION INTRAMUSCULAR at 08:31

## 2020-04-30 ASSESSMENT — ENCOUNTER SYMPTOMS
NEUROLOGICAL NEGATIVE: 1
MUSCULOSKELETAL NEGATIVE: 1
SLEEP DISTURBANCE: 0
GASTROINTESTINAL NEGATIVE: 1
MYALGIAS: 0
PHOTOPHOBIA: 0
NECK STIFFNESS: 0
NUMBNESS: 0
CHILLS: 0
FEVER: 0
PSYCHIATRIC NEGATIVE: 1
NECK PAIN: 0
RESPIRATORY NEGATIVE: 1
HEMATOLOGIC/LYMPHATIC NEGATIVE: 1
CONSTITUTIONAL NEGATIVE: 1
ALLERGIC/IMMUNOLOGIC NEGATIVE: 1
SHORTNESS OF BREATH: 0
ENDOCRINE NEGATIVE: 1
CARDIOVASCULAR NEGATIVE: 1
EYES NEGATIVE: 1
WEAKNESS: 0

## 2020-04-30 NOTE — ED NOTES
"Patient ambulated to room; niels.  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  Patient noted he had a lot of pain on his left lower back that radiated down his left buttocks and left leg.  Notes that he thought it was just his body's way of \"getting back into it\" but states the symptoms are persisting and he thought he should come in for evaluation.  Patient is awake/alert and interactive.  Patient is able to walk with some \"limping\" noted.  To room.  Gown on and nursing assessment as charted.  Call light within reach.  "

## 2020-04-30 NOTE — ED NOTES
Patient refusing to wait for discharge instructions and/or repeat vital signs.  Patient notes that he has a taxi outside waiting for him.  Patient is aware that he has a RX waiting for him at [facility].  Patient without for questions and/or concerns.  Home.

## 2020-04-30 NOTE — ED AVS SNAPSHOT
[facility]  [address] [address]  Phone:  [phone]                                    Jarett Duenas   MRN: [id number]    Department:  [facility]   Date of Visit:  4/30/2020           After Visit Summary Signature Page    I have received my discharge instructions, and my questions have been answered. I have discussed any challenges I see with this plan with the nurse or doctor.    ..........................................................................................................................................  Patient/Patient Representative Signature      ..........................................................................................................................................  Patient Representative Print Name and Relationship to Patient    ..................................................               ................................................  Date                                   Time    ..........................................................................................................................................  Reviewed by Signature/Title    ...................................................              ..............................................  Date                                               Time          22EPIC Rev 08/18

## 2020-04-30 NOTE — DISCHARGE INSTRUCTIONS
1) Follow the aftercare instructions provided  2) You have been given a medication or prescription for a medication that can make you drowsy. Do not drink, drive, ride a bicycle or operate any heavy machinery while using this medication.   3) You must follow up with your doctor in 12 to 24 hours.

## 2020-04-30 NOTE — ED PROVIDER NOTES
History     Chief Complaint   Patient presents with     Back Pain     HPI  Jarett Duenas is a 44 year old male who presents today with complaints of back pain.  Symptoms began approximately 2 days ago.  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  Patient states he works from Monday through Saturday the week before.  He had one day of rest on Sunday and symptoms began on Tuesday.  Patient denies any direct trauma.  Patient states that occasionally he will feel some pain shooting down his left leg.  Patient has tried Motrin without significant relief.  Patient otherwise in usual state of health.  Denies any IVDU    Allergies:  No Known Allergies    Problem List:    Patient Active Problem List    Diagnosis Date Noted     CARDIOVASCULAR SCREENING; LDL GOAL LESS THAN 160 10/31/2010     Priority: Medium     Sprain of thoracic region 01/02/2006     Priority: Medium        Past Medical History:    Past Medical History:   Diagnosis Date     NO ACTIVE PROBLEMS        Past Surgical History:    No past surgical history on file.    Family History:    No family history on file.    Social History:  Marital Status:  Single [1]  Social History     Tobacco Use     Smoking status: Current Every Day Smoker     Packs/day: 0.50     Years: 17.00     Pack years: 8.50     Types: Cigarettes     Smokeless tobacco: Never Used   Substance Use Topics     Alcohol use: Yes     Comment: occasional     Drug use: Yes     Types: Marijuana     Comment: occasional        Medications:    cyclobenzaprine (FLEXERIL) 10 MG tablet  Ferrous Sulfate 324 (65 Fe) MG TBEC  IBUPROFEN PO  LevETIRAcetam (KEPPRA PO)          Review of Systems   Constitutional: Negative.  Negative for chills and fever.   HENT: Negative.    Eyes: Negative.  Negative for photophobia.   Respiratory: Negative.  Negative for shortness of breath.    Cardiovascular: Negative.    Gastrointestinal: Negative.     Endocrine: Negative.    Genitourinary: Negative.    Musculoskeletal: Negative.  Negative for myalgias, neck pain and neck stiffness.   Skin: Negative.    Allergic/Immunologic: Negative.    Neurological: Negative.  Negative for weakness and numbness.   Hematological: Negative.    Psychiatric/Behavioral: Negative.  Negative for self-injury, sleep disturbance and suicidal ideas.       Physical Exam   BP: 130/75  Pulse: 75  Temp: 97.6  F (36.4  C)  Resp: 16  SpO2: 97 %      Physical Exam  Constitutional:       General: He is not in acute distress.     Appearance: Normal appearance. He is normal weight. He is not ill-appearing or toxic-appearing.   HENT:      Head: Normocephalic.   Neck:      Musculoskeletal: Normal range of motion and neck supple. No neck rigidity or muscular tenderness.   Pulmonary:      Effort: Pulmonary effort is normal.   Abdominal:      General: Abdomen is flat. There is no distension.      Tenderness: There is no abdominal tenderness. There is no right CVA tenderness or left CVA tenderness.   Musculoskeletal:      Comments: Slight left lower back tenderness worse with movement.  No C-spine T-spine or L-spine tenderness    Full range of motion of back but limited at extremes secondary to pain   Skin:     General: Skin is warm.   Neurological:      General: No focal deficit present.      Mental Status: He is alert.      Cranial Nerves: No cranial nerve deficit.      Comments: 5 out of 5 muscle strength bilaterally upper and lower extremities   Psychiatric:         Mood and Affect: Mood normal.         Behavior: Behavior normal.         Thought Content: Thought content normal.         Judgment: Judgment normal.         ED Course        Procedures           No results found for this or any previous visit (from the past 24 hour(s)).    Medications   ketorolac (TORADOL) injection 60 mg (has no administration in time range)       Assessments & Plan (with Medical Decision Making)     44-year-old male  with complaints of lower back pain.  Symptoms began gradually.  Physical examination consistent with musculoskeletal pain.  Patient denies any other neurological symptoms.  Patient treated symptomatically here in the ER and going to be discharged home with Flexeril.  Patient understands to follow-up with doctor in 12 to 24 hours. If symptoms persist or he develops continuing pain including shooting pain down his leg he will need further radiographs including possible CT.  Patient understands and will follow-up with PCP.    Due to the nature of this electronic medical record, laboratory results, imaging results, diagnosis, other information and medications reported above may not represent information available to me at the the time of my care and disposition. Medications reported above may have not been ordered by me.     Portions of the record may have been created with voice recognition software. Occasional wrong-word or 'sound-a- like' substitution may have occurred due to the inherent limitations of voice recognition software. Though the chart has been reviewed, there may be inadvertent transcription errors. Read the chart carefully and recognize, using context, where substitutions have occurred.       New Prescriptions    CYCLOBENZAPRINE (FLEXERIL) 10 MG TABLET    Take 1 tablet (10 mg) by mouth At Bedtime for 3 days       Final diagnoses:   Strain of lumbar region, initial encounter       4/30/2020   [facility]     Asher Reid MD  04/30/20 4549

## 2020-06-29 ENCOUNTER — HOSPITAL ENCOUNTER (EMERGENCY)
Facility: HOSPITAL | Age: 45
Discharge: HOME | End: 2020-06-29
Attending: NURSE PRACTITIONER | Admitting: NURSE PRACTITIONER
Payer: COMMERCIAL

## 2020-06-29 ENCOUNTER — APPOINTMENT (OUTPATIENT)
Dept: GENERAL RADIOLOGY | Facility: HOSPITAL | Age: 45
End: 2020-06-29
Attending: NURSE PRACTITIONER
Payer: COMMERCIAL

## 2020-06-29 VITALS
DIASTOLIC BLOOD PRESSURE: 91 MMHG | OXYGEN SATURATION: 97 % | TEMPERATURE: 98.1 F | SYSTOLIC BLOOD PRESSURE: 144 MMHG | HEART RATE: 76 BPM | RESPIRATION RATE: 16 BRPM

## 2020-06-29 DIAGNOSIS — M25.562 LEFT KNEE PAIN: Primary | ICD-10-CM

## 2020-06-29 PROCEDURE — 99212 OFFICE O/P EST SF 10 MIN: CPT | Mod: Z6 | Performed by: NURSE PRACTITIONER

## 2020-06-29 PROCEDURE — 73562 X-RAY EXAM OF KNEE 3: CPT | Mod: TC,LT

## 2020-06-29 PROCEDURE — G0463 HOSPITAL OUTPT CLINIC VISIT: HCPCS

## 2020-06-29 ASSESSMENT — ENCOUNTER SYMPTOMS
JOINT SWELLING: 0
ARTHRALGIAS: 1
MYALGIAS: 1

## 2020-06-29 NOTE — ED AVS SNAPSHOT
[facility]  [address] [address]  Phone:  [phone]                                    Jarett Duenas   MRN: [id number]    Department:  [facility]   Date of Visit:  6/29/2020           After Visit Summary Signature Page    I have received my discharge instructions, and my questions have been answered. I have discussed any challenges I see with this plan with the nurse or doctor.    ..........................................................................................................................................  Patient/Patient Representative Signature      ..........................................................................................................................................  Patient Representative Print Name and Relationship to Patient    ..................................................               ................................................  Date                                   Time    ..........................................................................................................................................  Reviewed by Signature/Title    ...................................................              ..............................................  Date                                               Time          22EPIC Rev 08/18

## 2020-06-29 NOTE — ED TRIAGE NOTES
Patient presents today with c/o left knee pain.   States twisted 1 day ago - states was having a couple drinks and tried to open a screen door and fell outside.   Feels knee gave out.  Entire knee is painful.   Pt states has had pain for about 1 month - works on knees for living.   When twisted - made pain worse.   Denies any additional injuries / hitting head / LOC  Did ambulate into room.   8/10.   Hx of hyperextension when 13 years old.  Not taking anything OTC for pain or discomfort.

## 2020-06-29 NOTE — DISCHARGE INSTRUCTIONS
Use knee immobilizer.  Take ibuprofen or Tylenol as needed for pain.    Schedule an appointment with [facility] for further evaluation.    Return to emergency department for worsening concerning symptoms.

## 2020-06-29 NOTE — ED PROVIDER NOTES
History     Chief Complaint   Patient presents with     Knee Pain     HPI  Jarett Duenas is a 45 year old male who presents ambulatory for knee pain. He twisted his left knee yesterday when he was opening the screen door to his home. He was still able to ambulate. He was trying to work today when he felt tightening of his knee and increase in pain especially with movement. He feels like his knee is unstable when he walks but has been ambulating. Reports chronic knee pain since he was 13 years old and was supposed to have surgery at some point but never did. Denies any swelling.      Allergies:  No Known Allergies    Problem List:    Patient Active Problem List    Diagnosis Date Noted     CARDIOVASCULAR SCREENING; LDL GOAL LESS THAN 160 10/31/2010     Priority: Medium     Sprain of thoracic region 01/02/2006     Priority: Medium        Past Medical History:    Past Medical History:   Diagnosis Date     NO ACTIVE PROBLEMS        Past Surgical History:    History reviewed. No pertinent surgical history.    Family History:    No family history on file.    Social History:  Marital Status:  Single [1]  Social History     Tobacco Use     Smoking status: Current Every Day Smoker     Packs/day: 0.50     Years: 17.00     Pack years: 8.50     Types: Cigarettes     Smokeless tobacco: Never Used   Substance Use Topics     Alcohol use: Yes     Comment: occasional     Drug use: Yes     Types: Marijuana     Comment: occasional        Medications:    Ferrous Sulfate 324 (65 Fe) MG TBEC  IBUPROFEN PO  LevETIRAcetam (KEPPRA PO)          Review of Systems   Musculoskeletal: Positive for arthralgias and myalgias. Negative for joint swelling.   All other systems reviewed and are negative.      Physical Exam   BP: 144/91  Pulse: 76  Temp: 98.1  F (36.7  C)  Resp: 16  SpO2: 97 %      Physical Exam  Vitals signs and nursing note reviewed.   Constitutional:       Appearance: Normal appearance. He is not ill-appearing or  toxic-appearing.   HENT:      Head: Normocephalic.   Eyes:      Pupils: Pupils are equal, round, and reactive to light.   Cardiovascular:      Rate and Rhythm: Normal rate.   Pulmonary:      Effort: Pulmonary effort is normal.   Musculoskeletal:      Left knee: He exhibits normal range of motion, no swelling, no effusion, no deformity, no erythema and normal meniscus. No tenderness found. No lateral joint line tenderness noted.      Comments: Full ROM to left knee. No effusion or TTP. Negative Aidan's. No appreciable LCL or MCL laxity.    Skin:     General: Skin is dry.      Capillary Refill: Capillary refill takes less than 2 seconds.      Findings: No bruising or erythema.   Neurological:      Mental Status: He is alert and oriented to person, place, and time.         ED Course        Procedures               Results for orders placed or performed during the hospital encounter of 06/29/20 (from the past 24 hour(s))   XR Knee Left 3 Views    Narrative    PROCEDURE:  XR KNEE LT 3 VW    HISTORY: Left knee pain.    COMPARISON:  None.    TECHNIQUE:  3 views left knee.    FINDINGS:  No fracture or dislocation is identified. The joint spaces  are preserved. No foreign body is seen.       Impression    IMPRESSION: No acute fracture.      DEMI AGUILAR MD       Medications - No data to display    Assessments & Plan (with Medical Decision Making)   Left knee pain:  Patient presented ambulatory to urgent care for complaints of left knee pain after accidentally twisting his knee while opening a screen door yesterday.  He does report a history of chronic left knee pain and was supposed to get surgery but never did.  Today he reports an increase in pain with movement as well as feeling like his knee is going to give out on him. Full ROM to left knee. No effusion or TTP. Negative Aidan's. No appreciable LCL or MCL laxity.   Discussed findings with patient.  I suspect patient has injury to knee ligaments based on his  history and current symptoms.  Knee immobilizer applied in urgent care.  Referral placed to [facility] per patient choice for further evaluation.  Return to emergency department for worsening concerning symptoms.  Patient verbalized understanding.    I have reviewed the nursing notes.    I have reviewed the findings, diagnosis, plan and need for follow up with the patient.      New Prescriptions    No medications on file       Final diagnoses:   Left knee pain       6/29/2020   [facility]     Mpofu, Fawadudence, CNP  06/29/20 1010

## 2020-07-08 ENCOUNTER — MEDICAL CORRESPONDENCE (OUTPATIENT)
Dept: MRI IMAGING | Facility: HOSPITAL | Age: 45
End: 2020-07-08

## 2020-08-24 ENCOUNTER — HOSPITAL ENCOUNTER (EMERGENCY)
Facility: HOSPITAL | Age: 45
Discharge: HOME | End: 2020-08-24
Attending: NURSE PRACTITIONER | Admitting: NURSE PRACTITIONER
Payer: COMMERCIAL

## 2020-08-24 ENCOUNTER — APPOINTMENT (OUTPATIENT)
Dept: GENERAL RADIOLOGY | Facility: HOSPITAL | Age: 45
End: 2020-08-24
Attending: NURSE PRACTITIONER
Payer: COMMERCIAL

## 2020-08-24 VITALS
RESPIRATION RATE: 20 BRPM | SYSTOLIC BLOOD PRESSURE: 160 MMHG | DIASTOLIC BLOOD PRESSURE: 87 MMHG | HEART RATE: 61 BPM | TEMPERATURE: 98.4 F | OXYGEN SATURATION: 98 %

## 2020-08-24 DIAGNOSIS — S29.012A MUSCLE STRAIN OF LEFT UPPER BACK, INITIAL ENCOUNTER: Primary | ICD-10-CM

## 2020-08-24 LAB
ALBUMIN SERPL-MCNC: 4 G/DL (ref 3.4–5)
ALBUMIN UR-MCNC: 30 MG/DL
ALP SERPL-CCNC: 51 U/L (ref 40–150)
ALT SERPL W P-5'-P-CCNC: 44 U/L (ref 0–70)
ANION GAP SERPL CALCULATED.3IONS-SCNC: 4 MMOL/L (ref 3–14)
APPEARANCE UR: CLEAR
AST SERPL W P-5'-P-CCNC: 58 U/L (ref 0–45)
BACTERIA #/AREA URNS HPF: ABNORMAL /HPF
BASOPHILS # BLD AUTO: 0.1 10E9/L (ref 0–0.2)
BASOPHILS NFR BLD AUTO: 0.6 %
BILIRUB SERPL-MCNC: 0.3 MG/DL (ref 0.2–1.3)
BILIRUB UR QL STRIP: NEGATIVE
BUN SERPL-MCNC: 14 MG/DL (ref 7–30)
CALCIUM SERPL-MCNC: 9 MG/DL (ref 8.5–10.1)
CHLORIDE SERPL-SCNC: 108 MMOL/L (ref 94–109)
CO2 SERPL-SCNC: 27 MMOL/L (ref 20–32)
COLOR UR AUTO: ABNORMAL
CREAT SERPL-MCNC: 0.84 MG/DL (ref 0.66–1.25)
D DIMER PPP FEU-MCNC: 0.5 UG/ML FEU (ref 0–0.5)
DIFFERENTIAL METHOD BLD: ABNORMAL
EOSINOPHIL # BLD AUTO: 0.1 10E9/L (ref 0–0.7)
EOSINOPHIL NFR BLD AUTO: 0.8 %
ERYTHROCYTE [DISTWIDTH] IN BLOOD BY AUTOMATED COUNT: 14.2 % (ref 10–15)
GFR SERPL CREATININE-BSD FRML MDRD: >90 ML/MIN/{1.73_M2}
GLUCOSE SERPL-MCNC: 68 MG/DL (ref 70–99)
GLUCOSE UR STRIP-MCNC: NEGATIVE MG/DL
HCT VFR BLD AUTO: 40.6 % (ref 40–53)
HGB BLD-MCNC: 14.1 G/DL (ref 13.3–17.7)
HGB UR QL STRIP: NEGATIVE
IMM GRANULOCYTES # BLD: 0 10E9/L (ref 0–0.4)
IMM GRANULOCYTES NFR BLD: 0.4 %
KETONES UR STRIP-MCNC: 5 MG/DL
LEUKOCYTE ESTERASE UR QL STRIP: NEGATIVE
LYMPHOCYTES # BLD AUTO: 1.4 10E9/L (ref 0.8–5.3)
LYMPHOCYTES NFR BLD AUTO: 13.7 %
MCH RBC QN AUTO: 32.7 PG (ref 26.5–33)
MCHC RBC AUTO-ENTMCNC: 34.7 G/DL (ref 31.5–36.5)
MCV RBC AUTO: 94 FL (ref 78–100)
MONOCYTES # BLD AUTO: 0.7 10E9/L (ref 0–1.3)
MONOCYTES NFR BLD AUTO: 6.8 %
MUCOUS THREADS #/AREA URNS LPF: PRESENT /LPF
NEUTROPHILS # BLD AUTO: 7.7 10E9/L (ref 1.6–8.3)
NEUTROPHILS NFR BLD AUTO: 77.7 %
NITRATE UR QL: NEGATIVE
NRBC # BLD AUTO: 0 10*3/UL
NRBC BLD AUTO-RTO: 0 /100
PH UR STRIP: 7.5 PH (ref 4.7–8)
PLATELET # BLD AUTO: 172 10E9/L (ref 150–450)
POTASSIUM SERPL-SCNC: 4.1 MMOL/L (ref 3.4–5.3)
PROT SERPL-MCNC: 7 G/DL (ref 6.8–8.8)
RBC # BLD AUTO: 4.31 10E12/L (ref 4.4–5.9)
RBC #/AREA URNS AUTO: 0 /HPF (ref 0–2)
SODIUM SERPL-SCNC: 139 MMOL/L (ref 133–144)
SOURCE: ABNORMAL
SP GR UR STRIP: 1.03 (ref 1–1.03)
TROPONIN I SERPL-MCNC: <0.015 UG/L (ref 0–0.04)
UROBILINOGEN UR STRIP-MCNC: NORMAL MG/DL (ref 0–2)
WBC # BLD AUTO: 9.9 10E9/L (ref 4–11)
WBC #/AREA URNS AUTO: <1 /HPF (ref 0–5)

## 2020-08-24 PROCEDURE — 36415 COLL VENOUS BLD VENIPUNCTURE: CPT | Performed by: NURSE PRACTITIONER

## 2020-08-24 PROCEDURE — 93010 ELECTROCARDIOGRAM REPORT: CPT | Performed by: INTERNAL MEDICINE

## 2020-08-24 PROCEDURE — 93005 ELECTROCARDIOGRAM TRACING: CPT

## 2020-08-24 PROCEDURE — 80053 COMPREHEN METABOLIC PANEL: CPT | Performed by: NURSE PRACTITIONER

## 2020-08-24 PROCEDURE — 85025 COMPLETE CBC W/AUTO DIFF WBC: CPT | Performed by: NURSE PRACTITIONER

## 2020-08-24 PROCEDURE — 81001 URINALYSIS AUTO W/SCOPE: CPT | Performed by: NURSE PRACTITIONER

## 2020-08-24 PROCEDURE — 71046 X-RAY EXAM CHEST 2 VIEWS: CPT | Mod: TC

## 2020-08-24 PROCEDURE — 84484 ASSAY OF TROPONIN QUANT: CPT | Performed by: NURSE PRACTITIONER

## 2020-08-24 PROCEDURE — 99284 EMERGENCY DEPT VISIT MOD MDM: CPT | Mod: Z6 | Performed by: NURSE PRACTITIONER

## 2020-08-24 PROCEDURE — 99285 EMERGENCY DEPT VISIT HI MDM: CPT | Mod: 25

## 2020-08-24 PROCEDURE — 85379 FIBRIN DEGRADATION QUANT: CPT | Performed by: NURSE PRACTITIONER

## 2020-08-24 RX ORDER — LEVETIRACETAM 500 MG/1
500 TABLET ORAL DAILY
COMMUNITY
Start: 2020-06-09

## 2020-08-24 RX ORDER — FERROUS GLUCONATE 324(38)MG
TABLET ORAL
COMMUNITY
Start: 2020-06-25 | End: 2021-11-24

## 2020-08-24 ASSESSMENT — HEART SCORE
ECG: NORMAL
HEART SCORE: 2
HISTORY: SLIGHTLY SUSPICIOUS
TROPONIN: LESS THAN OR EQUAL TO NORMAL LIMIT
AGE: 45-64
RISK FACTORS: 1-2 RISK FACTORS

## 2020-08-24 ASSESSMENT — ENCOUNTER SYMPTOMS
FATIGUE: 0
PALPITATIONS: 0
DIARRHEA: 0
FEVER: 0
ARTHRALGIAS: 0
FREQUENCY: 0
COUGH: 1
MYALGIAS: 0
HEMATURIA: 0
NAUSEA: 0
VOMITING: 0
DYSURIA: 0
DIZZINESS: 0
LIGHT-HEADEDNESS: 0
CHILLS: 0
SHORTNESS OF BREATH: 0
DIFFICULTY URINATING: 0
APPETITE CHANGE: 0
ABDOMINAL PAIN: 0

## 2020-08-24 NOTE — ED NOTES
Patient presents with concerns of left chest wall pain.  Patient states it hurts more with increase movement, touch and/or deep breath.  Patient is awake/alert and interactive.  Skin is pink, warm, dry and intact.  NSR on monitor.  Rest of nursing assessment as charted.  Call light within reach.

## 2020-08-24 NOTE — ED TRIAGE NOTES
Left sided chest wall pain that started Saturday.  Emptied tractor trailer on Friday. left sided chest wall pain more with movement. Worse also with deep breaths. Pain is localized.

## 2020-08-24 NOTE — DISCHARGE INSTRUCTIONS
(S29.711A) Muscle strain of left upper back, initial encounter  (primary encounter diagnosis)  45-year old male presents ambulatory for concerns of left sided chest pain that started yesterday. On exam the pain is actually to his left latissimus dorsi muscle. Pain is reproducible with palpation. He has no weakness of left shoulder, normal ROM. Given concerns initially for chest pain- EKG obtained and shoes sinus rhythm, unchanged from 2/2019 EKG. Troponin is negative, ddimer is normal. No acute leukocytosis or anemia. No acute electrolyte or renal abnormalities. At this time pain consistent with likely muscle strain from overexertion/lifting on Friday.   Recommend:  - acetaminophen (Tylenol) 1,00 mg three times daily  - ibuprofen (Advil) 800 mg with food ever 8 hour  *Alternate the ibuprofen and acetaminophen every 4 hours. For example: 8 am ibuprofen, 12 pm acetaminophen, 4 pm ibuprofen, 8 pm acetaminophen*  - Topical anesthetic such as biofreeze, bengay, lidocaine, icy hot to area of discomfort  - Ice and/or heat to area of discomfort  - Back/shoulder stretches        RETURN TO THE ED WITH NEW OR WORSENING SYMPTOMS.    FOLLOW-UP WITH YOUR PRIMARY CARE PROVIDER IN 10-14 DAYS.      Jessica Ruiz CNP    Results for orders placed or performed during the hospital encounter of 08/24/20   XR Chest 2 Views     Status: None    Narrative    Procedure:XR CHEST 2 VW    Clinical history:Male, 45 years, chest pain    Technique: Two views are submitted.    Comparison: None    Findings: The cardiac silhouette is . The pulmonary vasculature is  normal.    The lungs demonstrate flattening of the hemidiaphragms however appear  to be clear. Bony structures are unremarkable. No evidence of  pneumothorax.      Impression    Impression:   Hyperinflation of the lungs without evidence of pneumonia, CHF,  pneumothorax or rib fracture.    SAWYER LUJAN MD   CBC with platelets differential     Status: Abnormal   Result Value Ref Range     WBC 9.9 4.0 - 11.0 10e9/L    RBC Count 4.31 (L) 4.4 - 5.9 10e12/L    Hemoglobin 14.1 13.3 - 17.7 g/dL    Hematocrit 40.6 40.0 - 53.0 %    MCV 94 78 - 100 fl    MCH 32.7 26.5 - 33.0 pg    MCHC 34.7 31.5 - 36.5 g/dL    RDW 14.2 10.0 - 15.0 %    Platelet Count 172 150 - 450 10e9/L    Diff Method Automated Method     % Neutrophils 77.7 %    % Lymphocytes 13.7 %    % Monocytes 6.8 %    % Eosinophils 0.8 %    % Basophils 0.6 %    % Immature Granulocytes 0.4 %    Nucleated RBCs 0 0 /100    Absolute Neutrophil 7.7 1.6 - 8.3 10e9/L    Absolute Lymphocytes 1.4 0.8 - 5.3 10e9/L    Absolute Monocytes 0.7 0.0 - 1.3 10e9/L    Absolute Eosinophils 0.1 0.0 - 0.7 10e9/L    Absolute Basophils 0.1 0.0 - 0.2 10e9/L    Abs Immature Granulocytes 0.0 0 - 0.4 10e9/L    Absolute Nucleated RBC 0.0    Comprehensive metabolic panel     Status: Abnormal   Result Value Ref Range    Sodium 139 133 - 144 mmol/L    Potassium 4.1 3.4 - 5.3 mmol/L    Chloride 108 94 - 109 mmol/L    Carbon Dioxide 27 20 - 32 mmol/L    Anion Gap 4 3 - 14 mmol/L    Glucose 68 (L) 70 - 99 mg/dL    Urea Nitrogen 14 7 - 30 mg/dL    Creatinine 0.84 0.66 - 1.25 mg/dL    GFR Estimate >90 >60 mL/min/[1.73_m2]    GFR Estimate If Black >90 >60 mL/min/[1.73_m2]    Calcium 9.0 8.5 - 10.1 mg/dL    Bilirubin Total 0.3 0.2 - 1.3 mg/dL    Albumin 4.0 3.4 - 5.0 g/dL    Protein Total 7.0 6.8 - 8.8 g/dL    Alkaline Phosphatase 51 40 - 150 U/L    ALT 44 0 - 70 U/L    AST 58 (H) 0 - 45 U/L   D dimer quantitative     Status: None   Result Value Ref Range    D Dimer 0.5 0.0 - 0.50 ug/ml FEU   Troponin I     Status: None   Result Value Ref Range    Troponin I ES <0.015 0.000 - 0.045 ug/L

## 2020-08-24 NOTE — ED PROVIDER NOTES
"  History     Chief Complaint   Patient presents with     Chest Wall Pain     HPI     Jarett Duenas is a 45 year old male with history of seizure disorder who presents ambulatory with left sided chest pain that started yesterday. 0/10 pain at rest. Pain increases with movement, coughing, pressing/pushing on areas \"it is sore to the touch.\" Denies recent fever, chills, myalgia, arthralgias, abdominal pain, nausea, vomiting, diarrhea. Pain does not worsen with exertion- walked here from [facility] street without difficulty. This morning when to work and lifted a stove onto a david and could feel the discomfort. He took some ibuprofen 600 mg this morning around 0700 \"and it seemed like it kind of helped.  Denies shortness of breath, new lower extremity edema or discomfort.     He admits to compliance with keppra. No recent seizures.  He is a current 1/2 ppd smoker.         Allergies:  No Known Allergies    Problem List:    Patient Active Problem List    Diagnosis Date Noted     CARDIOVASCULAR SCREENING; LDL GOAL LESS THAN 160 10/31/2010     Priority: Medium     Sprain of thoracic region 01/02/2006     Priority: Medium        Past Medical History:    Past Medical History:   Diagnosis Date     NO ACTIVE PROBLEMS        Past Surgical History:    History reviewed. No pertinent surgical history.    Family History:    History reviewed. No pertinent family history.    Social History:  Marital Status:  Single [1]  Social History     Tobacco Use     Smoking status: Current Every Day Smoker     Packs/day: 0.50     Years: 17.00     Pack years: 8.50     Types: Cigarettes     Smokeless tobacco: Never Used   Substance Use Topics     Alcohol use: Yes     Comment: occasional     Drug use: Yes     Types: Marijuana     Comment: occasional        Medications:    ferrous gluconate (FERGON) 324 (38 Fe) MG tablet  Ferrous Sulfate 324 (65 Fe) MG TBEC  IBUPROFEN PO  levETIRAcetam (KEPPRA) 500 MG tablet          Review of Systems " "  Constitutional: Negative for appetite change, chills, fatigue and fever.   Respiratory: Positive for cough (\"smokers cough\"). Negative for shortness of breath.    Cardiovascular: Positive for chest pain (left sided). Negative for palpitations and leg swelling.   Gastrointestinal: Negative for abdominal pain, diarrhea, nausea and vomiting.   Genitourinary: Negative for difficulty urinating, dysuria, frequency and hematuria.   Musculoskeletal: Negative for arthralgias and myalgias.   Skin: Negative for rash.   Neurological: Negative for dizziness and light-headedness.       Physical Exam   BP: (!) 163/11  Pulse: 70  Temp: 98.4  F (36.9  C)  Resp: 16  SpO2: 97 %      Physical Exam  Constitutional:       General: He is not in acute distress.     Appearance: He is not ill-appearing, toxic-appearing or diaphoretic.   Cardiovascular:      Rate and Rhythm: Normal rate and regular rhythm.      Pulses:           Posterior tibial pulses are 2+ on the right side and 2+ on the left side.      Heart sounds: S1 normal and S2 normal. No murmur. No friction rub. No gallop.    Pulmonary:      Effort: Pulmonary effort is normal.      Breath sounds: Normal breath sounds.   Chest:      Chest wall: No deformity, tenderness or crepitus.   Abdominal:      General: Abdomen is flat. Bowel sounds are normal.      Palpations: Abdomen is soft.      Tenderness: There is no abdominal tenderness. There is no guarding.   Musculoskeletal:        Back:       Right lower leg: No edema.      Left lower leg: No edema.   Skin:     General: Skin is warm and dry.      Capillary Refill: Capillary refill takes less than 2 seconds.   Neurological:      Mental Status: He is alert and oriented to person, place, and time.      GCS: GCS eye subscore is 4. GCS verbal subscore is 5. GCS motor subscore is 6.      Gait: Gait is intact.   Psychiatric:         Mood and Affect: Mood normal.         Speech: Speech normal.         Behavior: Behavior normal. Behavior is " cooperative.         ED Course     ED Course as of Aug 24 1256   Mon Aug 24, 2020   1215 Comprehensive metabolic panel     Procedures    Results for orders placed or performed during the hospital encounter of 08/24/20 (from the past 24 hour(s))   CBC with platelets differential   Result Value Ref Range    WBC 9.9 4.0 - 11.0 10e9/L    RBC Count 4.31 (L) 4.4 - 5.9 10e12/L    Hemoglobin 14.1 13.3 - 17.7 g/dL    Hematocrit 40.6 40.0 - 53.0 %    MCV 94 78 - 100 fl    MCH 32.7 26.5 - 33.0 pg    MCHC 34.7 31.5 - 36.5 g/dL    RDW 14.2 10.0 - 15.0 %    Platelet Count 172 150 - 450 10e9/L    Diff Method Automated Method     % Neutrophils 77.7 %    % Lymphocytes 13.7 %    % Monocytes 6.8 %    % Eosinophils 0.8 %    % Basophils 0.6 %    % Immature Granulocytes 0.4 %    Nucleated RBCs 0 0 /100    Absolute Neutrophil 7.7 1.6 - 8.3 10e9/L    Absolute Lymphocytes 1.4 0.8 - 5.3 10e9/L    Absolute Monocytes 0.7 0.0 - 1.3 10e9/L    Absolute Eosinophils 0.1 0.0 - 0.7 10e9/L    Absolute Basophils 0.1 0.0 - 0.2 10e9/L    Abs Immature Granulocytes 0.0 0 - 0.4 10e9/L    Absolute Nucleated RBC 0.0    Comprehensive metabolic panel   Result Value Ref Range    Sodium 139 133 - 144 mmol/L    Potassium 4.1 3.4 - 5.3 mmol/L    Chloride 108 94 - 109 mmol/L    Carbon Dioxide 27 20 - 32 mmol/L    Anion Gap 4 3 - 14 mmol/L    Glucose 68 (L) 70 - 99 mg/dL    Urea Nitrogen 14 7 - 30 mg/dL    Creatinine 0.84 0.66 - 1.25 mg/dL    GFR Estimate >90 >60 mL/min/[1.73_m2]    GFR Estimate If Black >90 >60 mL/min/[1.73_m2]    Calcium 9.0 8.5 - 10.1 mg/dL    Bilirubin Total 0.3 0.2 - 1.3 mg/dL    Albumin 4.0 3.4 - 5.0 g/dL    Protein Total 7.0 6.8 - 8.8 g/dL    Alkaline Phosphatase 51 40 - 150 U/L    ALT 44 0 - 70 U/L    AST 58 (H) 0 - 45 U/L   D dimer quantitative   Result Value Ref Range    D Dimer 0.5 0.0 - 0.50 ug/ml FEU   Troponin I   Result Value Ref Range    Troponin I ES <0.015 0.000 - 0.045 ug/L   XR Chest 2 Views    Narrative    Procedure:XR CHEST 2  VW    Clinical history:Male, 45 years, chest pain    Technique: Two views are submitted.    Comparison: None    Findings: The cardiac silhouette is . The pulmonary vasculature is  normal.    The lungs demonstrate flattening of the hemidiaphragms however appear  to be clear. Bony structures are unremarkable. No evidence of  pneumothorax.      Impression    Impression:   Hyperinflation of the lungs without evidence of pneumonia, CHF,  pneumothorax or rib fracture.    SAWYER LUJAN MD       Medications - No data to display    Assessments & Plan (with Medical Decision Making)     I have reviewed the nursing notes.    I have reviewed the findings, diagnosis, plan and need for follow up with the patient.  (S29.827A) Muscle strain of left upper back, initial encounter  (primary encounter diagnosis)  45-year old male presents ambulatory for concerns of left sided chest pain that started yesterday. On exam the pain is actually to his left latissimus dorsi muscle. Pain is reproducible with palpation. He has no weakness of left shoulder, normal ROM. Given concerns initially for chest pain- EKG obtained and shoes sinus rhythm, unchanged from 2/2019 EKG. Troponin is negative, ddimer is normal. No acute leukocytosis or anemia. No acute electrolyte or renal abnormalities. At this time pain consistent with likely muscle strain from overexertion/lifting on Friday.   Recommend:  - acetaminophen (Tylenol) 1,00 mg three times daily  - ibuprofen (Advil) 800 mg with food ever 8 hour  *Alternate the ibuprofen and acetaminophen every 4 hours. For example: 8 am ibuprofen, 12 pm acetaminophen, 4 pm ibuprofen, 8 pm acetaminophen*  - Topical anesthetic such as biofreeze, bengay, lidocaine, icy hot to area of discomfort  - Ice and/or heat to area of discomfort  - Back/shoulder stretches        RETURN TO THE ED WITH NEW OR WORSENING SYMPTOMS.    FOLLOW-UP WITH YOUR PRIMARY CARE PROVIDER IN 10-14 DAYS.      Jessica Ruiz, SHIRA    New  Prescriptions    No medications on file       Final diagnoses:   Muscle strain of left upper back, initial encounter       8/24/2020   [facility]     Jessica Ruiz CNP  08/27/20 0013

## 2020-08-24 NOTE — ED AVS SNAPSHOT
[facility]  [address] [address]  Phone:  [phone]                                    Jarett Duenas   MRN: [id number]    Department:  [facility]   Date of Visit:  8/24/2020           After Visit Summary Signature Page    I have received my discharge instructions, and my questions have been answered. I have discussed any challenges I see with this plan with the nurse or doctor.    ..........................................................................................................................................  Patient/Patient Representative Signature      ..........................................................................................................................................  Patient Representative Print Name and Relationship to Patient    ..................................................               ................................................  Date                                   Time    ..........................................................................................................................................  Reviewed by Signature/Title    ...................................................              ..............................................  Date                                               Time          22EPIC Rev 08/18

## 2020-08-24 NOTE — ED NOTES
Patient states he is feeling much better.  Discharge instructions reviewed with patient and he has no further questions at this time.  Work for note given.  Home.

## 2020-09-24 ENCOUNTER — HOSPITAL ENCOUNTER (EMERGENCY)
Facility: HOSPITAL | Age: 45
Discharge: HOME | End: 2020-09-24
Attending: EMERGENCY MEDICINE | Admitting: EMERGENCY MEDICINE
Payer: COMMERCIAL

## 2020-09-24 VITALS
OXYGEN SATURATION: 96 % | SYSTOLIC BLOOD PRESSURE: 147 MMHG | DIASTOLIC BLOOD PRESSURE: 95 MMHG | HEART RATE: 64 BPM | TEMPERATURE: 98.9 F | RESPIRATION RATE: 16 BRPM

## 2020-09-24 DIAGNOSIS — R79.89 ELEVATED TROPONIN: ICD-10-CM

## 2020-09-24 DIAGNOSIS — G40.909 SEIZURE DISORDER (H): ICD-10-CM

## 2020-09-24 DIAGNOSIS — Z78.9 ALCOHOL USE: ICD-10-CM

## 2020-09-24 DIAGNOSIS — Z91.199 NON-COMPLIANCE: ICD-10-CM

## 2020-09-24 LAB
ALBUMIN SERPL-MCNC: 3.3 G/DL (ref 3.4–5)
ALP SERPL-CCNC: 50 U/L (ref 40–150)
ALT SERPL W P-5'-P-CCNC: 107 U/L (ref 0–70)
ANION GAP SERPL CALCULATED.3IONS-SCNC: 5 MMOL/L (ref 3–14)
AST SERPL W P-5'-P-CCNC: 156 U/L (ref 0–45)
BASOPHILS # BLD AUTO: 0.1 10E9/L (ref 0–0.2)
BASOPHILS NFR BLD AUTO: 0.6 %
BILIRUB SERPL-MCNC: 0.8 MG/DL (ref 0.2–1.3)
BUN SERPL-MCNC: 10 MG/DL (ref 7–30)
CALCIUM SERPL-MCNC: 8.1 MG/DL (ref 8.5–10.1)
CHLORIDE SERPL-SCNC: 105 MMOL/L (ref 94–109)
CO2 SERPL-SCNC: 26 MMOL/L (ref 20–32)
CREAT SERPL-MCNC: 0.73 MG/DL (ref 0.66–1.25)
DIFFERENTIAL METHOD BLD: ABNORMAL
EOSINOPHIL # BLD AUTO: 0.1 10E9/L (ref 0–0.7)
EOSINOPHIL NFR BLD AUTO: 0.6 %
ERYTHROCYTE [DISTWIDTH] IN BLOOD BY AUTOMATED COUNT: 13.3 % (ref 10–15)
ETHANOL SERPL-MCNC: <0.01 G/DL
GFR SERPL CREATININE-BSD FRML MDRD: >90 ML/MIN/{1.73_M2}
GLUCOSE SERPL-MCNC: 109 MG/DL (ref 70–99)
HCT VFR BLD AUTO: 39.2 % (ref 40–53)
HGB BLD-MCNC: 13.8 G/DL (ref 13.3–17.7)
IMM GRANULOCYTES # BLD: 0 10E9/L (ref 0–0.4)
IMM GRANULOCYTES NFR BLD: 0.3 %
LIPASE SERPL-CCNC: 118 U/L (ref 73–393)
LYMPHOCYTES # BLD AUTO: 0.7 10E9/L (ref 0.8–5.3)
LYMPHOCYTES NFR BLD AUTO: 7.6 %
MAGNESIUM SERPL-MCNC: 1.7 MG/DL (ref 1.6–2.3)
MCH RBC QN AUTO: 32.5 PG (ref 26.5–33)
MCHC RBC AUTO-ENTMCNC: 35.2 G/DL (ref 31.5–36.5)
MCV RBC AUTO: 93 FL (ref 78–100)
MONOCYTES # BLD AUTO: 0.6 10E9/L (ref 0–1.3)
MONOCYTES NFR BLD AUTO: 6.7 %
NEUTROPHILS # BLD AUTO: 7.6 10E9/L (ref 1.6–8.3)
NEUTROPHILS NFR BLD AUTO: 84.2 %
NRBC # BLD AUTO: 0 10*3/UL
NRBC BLD AUTO-RTO: 0 /100
PLATELET # BLD AUTO: 183 10E9/L (ref 150–450)
POTASSIUM SERPL-SCNC: 4.2 MMOL/L (ref 3.4–5.3)
PROT SERPL-MCNC: 6.2 G/DL (ref 6.8–8.8)
RBC # BLD AUTO: 4.24 10E12/L (ref 4.4–5.9)
SODIUM SERPL-SCNC: 136 MMOL/L (ref 133–144)
TROPONIN I SERPL-MCNC: 0.06 UG/L (ref 0–0.04)
TROPONIN I SERPL-MCNC: 0.09 UG/L (ref 0–0.04)
WBC # BLD AUTO: 9 10E9/L (ref 4–11)

## 2020-09-24 PROCEDURE — 93010 ELECTROCARDIOGRAM REPORT: CPT | Performed by: INTERNAL MEDICINE

## 2020-09-24 PROCEDURE — 84484 ASSAY OF TROPONIN QUANT: CPT | Performed by: EMERGENCY MEDICINE

## 2020-09-24 PROCEDURE — 80177 DRUG SCRN QUAN LEVETIRACETAM: CPT

## 2020-09-24 PROCEDURE — 99284 EMERGENCY DEPT VISIT MOD MDM: CPT | Mod: Z6 | Performed by: EMERGENCY MEDICINE

## 2020-09-24 PROCEDURE — 80320 DRUG SCREEN QUANTALCOHOLS: CPT | Performed by: EMERGENCY MEDICINE

## 2020-09-24 PROCEDURE — 83690 ASSAY OF LIPASE: CPT | Performed by: EMERGENCY MEDICINE

## 2020-09-24 PROCEDURE — 85025 COMPLETE CBC W/AUTO DIFF WBC: CPT | Performed by: EMERGENCY MEDICINE

## 2020-09-24 PROCEDURE — 25800030 ZZH RX IP 258 OP 636: Performed by: EMERGENCY MEDICINE

## 2020-09-24 PROCEDURE — 25000132 ZZH RX MED GY IP 250 OP 250 PS 637: Performed by: EMERGENCY MEDICINE

## 2020-09-24 PROCEDURE — 83735 ASSAY OF MAGNESIUM: CPT | Performed by: EMERGENCY MEDICINE

## 2020-09-24 PROCEDURE — 80053 COMPREHEN METABOLIC PANEL: CPT | Performed by: EMERGENCY MEDICINE

## 2020-09-24 PROCEDURE — 93005 ELECTROCARDIOGRAM TRACING: CPT

## 2020-09-24 PROCEDURE — 36415 COLL VENOUS BLD VENIPUNCTURE: CPT | Performed by: EMERGENCY MEDICINE

## 2020-09-24 PROCEDURE — 99284 EMERGENCY DEPT VISIT MOD MDM: CPT

## 2020-09-24 RX ORDER — LEVETIRACETAM 500 MG/1
1000 TABLET ORAL ONCE
Status: COMPLETED | OUTPATIENT
Start: 2020-09-24 | End: 2020-09-24

## 2020-09-24 RX ADMIN — SODIUM CHLORIDE 1000 ML: 9 INJECTION, SOLUTION INTRAVENOUS at 10:21

## 2020-09-24 RX ADMIN — LEVETIRACETAM 1000 MG: 500 TABLET, FILM COATED ORAL at 10:20

## 2020-09-24 NOTE — ED AVS SNAPSHOT
[facility]  [address] [address]  Phone:  [phone]                                    Jarett Duenas   MRN: [id number]    Department:  [facility]   Date of Visit:  9/24/2020           After Visit Summary Signature Page    I have received my discharge instructions, and my questions have been answered. I have discussed any challenges I see with this plan with the nurse or doctor.    ..........................................................................................................................................  Patient/Patient Representative Signature      ..........................................................................................................................................  Patient Representative Print Name and Relationship to Patient    ..................................................               ................................................  Date                                   Time    ..........................................................................................................................................  Reviewed by Signature/Title    ...................................................              ..............................................  Date                                               Time          22EPIC Rev 08/18

## 2020-09-24 NOTE — ED NOTES
Pt here via [facility] with c/o seizures x2, one around 0500 and one around 0930 today. Pt's significant other contacted EMS. Pt states he has been binge drinking for about a week and last drank yesterday. Per pt, significant other states that his seizures were both less than a minute long. Pt has a hx of seizures and is prescribed Keppra. Pt is unsure of when he last took his Keppra. Pt is alert, oriented, answering questions appropriately. Denies pain currently. Seizure pads in place. Call light within reach.

## 2020-09-24 NOTE — DISCHARGE INSTRUCTIONS
If you consider further evaluation.    Please take your Keppra 500 mg by mouth once in the morning and once in the evening.    Do not skip or miss medications.  Avoid alcohol use or drink alcohol in moderation.    Return to the emergency department if fever, chest pain, neck pain or jaw pain.

## 2020-09-24 NOTE — ED NOTES
Pt discharged at this time. Discharge instructions reviewed. Instructed to return with any new or worsening symptoms. Pt verbalizes understanding.

## 2020-09-24 NOTE — ED PROVIDER NOTES
"  History     Chief Complaint   Patient presents with     Seizures     HPI       Jarett Duenas is a 45 year old male who presents via EMS from home for \"seizure\".  Patient has reported history of seizures, on Keppra and alcohol use.  Over last week is been using more alcohol to drink less yesterday.  Patient had 2 witnessed seizures at home by his significant other.  Without trauma both fairly brief lasting a minute or so.  Due to  the second seizure occurring today EMS was called for further relation.  Patient had normal blood pressure, pulse, temperature and respiratory rate as well as oxygen saturations and normal glucose.  Patient was transported here cooperative and pleasant and is otherwise good historian.    There is been no runny nose, sore throat, cough, change in taste or smell.  No cold exposures.    Patient is not on blood thinners.  There is been no headache, neck pain, photophobia, tick bites, abdominal pain, diarrhea, bloody stools, skin rash or trauma.    My assessment patient notes she has not had his Keppra today and that he took it once yesterday.  He admits that he is post take twice a day.  He takes 1 tablet of Keppra in the morning.  He denies any abdominal pain or distention.  He denies any confusion.  He denies any falls or trauma.  He is not sure if he had seizure this morning or not he was awoken by his girlfriend and told by his report that he had had a \"seizure\" this morning while sleeping.  Does feel like the tip of his tongue has been bit.    Denies drug use.  Patient reports drinking 3-4 beers a day.  He does maintain a job.      Allergies:  No Known Allergies    Problem List:    Patient Active Problem List    Diagnosis Date Noted     CARDIOVASCULAR SCREENING; LDL GOAL LESS THAN 160 10/31/2010     Priority: Medium     Sprain of thoracic region 01/02/2006     Priority: Medium        Past Medical History:    Past Medical History:   Diagnosis Date     NO ACTIVE PROBLEMS        Past " Surgical History:    History reviewed. No pertinent surgical history.    Family History:    History reviewed. No pertinent family history.    Social History:  Marital Status:  Single [1]  Social History     Tobacco Use     Smoking status: Current Every Day Smoker     Packs/day: 0.50     Years: 17.00     Pack years: 8.50     Types: Cigarettes     Smokeless tobacco: Never Used   Substance Use Topics     Alcohol use: Yes     Comment: last drink; 0400     Drug use: Yes     Types: Marijuana     Comment: occasional        Medications:    levETIRAcetam (KEPPRA) 500 MG tablet  ferrous gluconate (FERGON) 324 (38 Fe) MG tablet  Ferrous Sulfate 324 (65 Fe) MG TBEC  IBUPROFEN PO          Review of Systems  Patient has any runny nose, sore throat, cough, change in taste or smell.  No headache.  No chest pain, palpitations, abdominal pain, diarrhea, no distention, no skin rashes.  No bloody or black stools.  All 10 systems are negative.    Physical Exam   BP: (!) 153/104  Pulse: 73  Temp: 98.4  F (36.9  C)  Resp: (!) 10  SpO2: 97 %      Physical Exam      Primary Survey:  Airway patent  Breath sounds equal bilateral  Circulation intact distally  D: Gcs 15  Exposure for trauma exam    2nd survey:    Vitals: reviewed  Head: no trauma  Eyes.  Conjunctiva noninjected.  Pupils equal round reactive to light.  Anicteric.  No ocular or lid trauma is seen.  No nystagmus.  ENT: No drainage or bleeding noted.  TMs without hemotympanum.  EACs clear.  No nasal blood or septal hematoma.  Airway is patent without drooling or voice changes.  Normal mastoids.  Tongue has small abrasion at distal tip.  Dentition in poor repair but no signs of dental ovular swelling, dental fracture or trauma.  Neck: No midline tenderness no step-offs:  Pulmonary: Chest movement is symmetrical.  No chest wall ecchymosis, subcu emphysema, tenderness or crepitus.  CV: rate. No jvd. Intact equal peripheral pulses  : normal flanks  Abd: Soft, nontender, nondistended,  "normal bowel sounds.  No rebound.  No abrasions or ecchymosis.  Skin: No abrasions, ecchymosis or contusions.  No petechia or purpura.  He has multiple tattoos including \"iron ranger\"  Neuro: Motor and sensory exam nonfocal.  Moves all extremities without difficulty.  GCS 15.  She has no clonus or rigidity.  He has fluent speech symmetric smile tongue is midline.  Intact hearing.  He has no arm or leg weakness.  There is no hand tremors or tongue fasciculations.  He is fluent and articulate speech.  He is well at room.  No meningismus.  Psych: Patient alert and oriented to person, place and time.  Mood and affect are unremarkable.  Memory is intact.  She is polite, articulate and a good pleasurable patient.  MS: Back nontender.  No extremity pain or deformity.  No midline CT or L-spine tenderness or step-offs.  Stable pelvis.    ED Course     ED Course as of Sep 24 1241   Thu Sep 24, 2020   1007 Patient notes he had not had his Keppra, he is unsure of his last oral keppra.       1022 Ekg:  Nsr  Rate 67 bpm  No stemi  Normal ekg.       1037 Patient's history exam is reassuring with normal vital signs and no current signs of alcohol withdrawal.  I suspect the precipitant for his witnessed \"seizures\" while he was asleep today at home was his noncompliance with Keppra use-daily 500mg instead of 500 mg po bid..  Patient was loaded here with oral Keppra and observed on cardiac monitor without ectopy or dysrhythmia.  His EKG is reassuring.  He has no covid symptoms. No signs of alcohol withdrawal. No trauma. No headache. Initial and serial neuro exams are normal.       1056 Repeat trop ordered. No cp or sob. Not suggestive of PE. Feels fine with activity.        1137 Sbp has increased episodically.  No cp. Sob or sx suggestive of angina. No tachycardia or tremors. Normal mental status.       1156 Patient rechecked.  Has normal mental status  and  he is watching television.  Blood pressure is normal.  There is no tongue " fasciculations or hand tremors.  He has requested discharge.  I reviewed the initial troponin which was actually inadvertently ordered incorrectly on this patient.  Due to  the troponin being upper limits of normal I recommend recheck, 2nd trop.  Of note, sx not suggestive of PE and not suggestive of evolving stemi/cardiac chest pain.       1237 Troponin I ES(!): 0.086   1237 2nd trop.  Minimal delta change. Patient request discharge.  Will discharge as requested.  Patient understands increased risk with elevated trop.          Procedures          EKG: see above.    Results for orders placed or performed during the hospital encounter of 09/24/20 (from the past 24 hour(s))   CBC with platelets differential   Result Value Ref Range    WBC 9.0 4.0 - 11.0 10e9/L    RBC Count 4.24 (L) 4.4 - 5.9 10e12/L    Hemoglobin 13.8 13.3 - 17.7 g/dL    Hematocrit 39.2 (L) 40.0 - 53.0 %    MCV 93 78 - 100 fl    MCH 32.5 26.5 - 33.0 pg    MCHC 35.2 31.5 - 36.5 g/dL    RDW 13.3 10.0 - 15.0 %    Platelet Count 183 150 - 450 10e9/L    Diff Method Automated Method     % Neutrophils 84.2 %    % Lymphocytes 7.6 %    % Monocytes 6.7 %    % Eosinophils 0.6 %    % Basophils 0.6 %    % Immature Granulocytes 0.3 %    Nucleated RBCs 0 0 /100    Absolute Neutrophil 7.6 1.6 - 8.3 10e9/L    Absolute Lymphocytes 0.7 (L) 0.8 - 5.3 10e9/L    Absolute Monocytes 0.6 0.0 - 1.3 10e9/L    Absolute Eosinophils 0.1 0.0 - 0.7 10e9/L    Absolute Basophils 0.1 0.0 - 0.2 10e9/L    Abs Immature Granulocytes 0.0 0 - 0.4 10e9/L    Absolute Nucleated RBC 0.0    Comprehensive metabolic panel   Result Value Ref Range    Sodium 136 133 - 144 mmol/L    Potassium 4.2 3.4 - 5.3 mmol/L    Chloride 105 94 - 109 mmol/L    Carbon Dioxide 26 20 - 32 mmol/L    Anion Gap 5 3 - 14 mmol/L    Glucose 109 (H) 70 - 99 mg/dL    Urea Nitrogen 10 7 - 30 mg/dL    Creatinine 0.73 0.66 - 1.25 mg/dL    GFR Estimate >90 >60 mL/min/[1.73_m2]    GFR Estimate If Black >90 >60 mL/min/[1.73_m2]     Calcium 8.1 (L) 8.5 - 10.1 mg/dL    Bilirubin Total 0.8 0.2 - 1.3 mg/dL    Albumin 3.3 (L) 3.4 - 5.0 g/dL    Protein Total 6.2 (L) 6.8 - 8.8 g/dL    Alkaline Phosphatase 50 40 - 150 U/L     (H) 0 - 70 U/L     (H) 0 - 45 U/L   Lipase   Result Value Ref Range    Lipase 118 73 - 393 U/L   Magnesium   Result Value Ref Range    Magnesium 1.7 1.6 - 2.3 mg/dL   Troponin I   Result Value Ref Range    Troponin I ES 0.063 (H) 0.000 - 0.045 ug/L   Alcohol ethyl   Result Value Ref Range    Ethanol g/dL <0.01 0.01 g/dL   Troponin I   Result Value Ref Range    Troponin I ES 0.086 (H) 0.000 - 0.045 ug/L       Medications   0.9% sodium chloride BOLUS (0 mLs Intravenous Stopped 9/24/20 1121)   levETIRAcetam (KEPPRA) tablet 1,000 mg (1,000 mg Oral Given 9/24/20 1020)       Assessments & Plan (with Medical Decision Making)     I have reviewed the nursing verbal report, previous records and serial assessments of the patient.     I have reviewed the findings, diagnosis, plan and need for follow up with the patient.  MDM:  Jarett DEWEY Duenas 45 year old presesnts with normal vital signs and normal mental status and a normal examination.  He is observed for multiple hours without ectopy or dysrhythmia.  He has been noncompliant with taking his Keppra which by his report is supposed to be 1000mg total daily dose and he only has been taking 500mg.   Troponin was inadvertently obtained initially and surprisingly at the upper limits of normal as such delta troponin was obtained and was relatively unchanged.  I discussed further with the patient this all the troponin and his lack of chest pain jaw pain shortness of breath or anginal symptomatology.  He also has no tachycardia leg pain or shortness yes or suggestion of PE.  Clinically does not any signs of myocarditis or pericarditis.  He is requested discharge home.  Patient has no signs alcohol draw here.  He has no tremulousness or tachycardia and no fasciculations.  He intends  to drink-socially.  He has mild elevations LFTs consistent with alcohol use disorder without emergent complications.  Patient would like discharge today.  He is currently stable for such.  I do not see indication for neuroimaging or further emergent work-up.  Return here per discharge instructions.      Final diagnoses:   Seizure disorder (H)   Non-compliance   Alcohol use   Elevated troponin     Venancio Alejo, DO  [facility]  [address] [address]  [phone]    In 1 day      [facility]  [address] [id number] [phone]        See Discharge instructions  Work note provided.     9/24/2020   [facility]     Moses Bajwa MD  09/24/20 2122

## 2020-09-24 NOTE — ED TRIAGE NOTES
"Patient presets for evaluation of seizures x 2.  States he has a history of seizures; both alcohol withdrawal and \"regular\" seizures.  "

## 2020-09-26 LAB — LEVETIRACETAM SERPL-MCNC: <2 UG/ML (ref 12–46)

## 2020-12-28 ENCOUNTER — APPOINTMENT (OUTPATIENT)
Dept: OCCUPATIONAL MEDICINE | Facility: OTHER | Age: 45
End: 2020-12-28

## 2020-12-28 PROCEDURE — 99000 SPECIMEN HANDLING OFFICE-LAB: CPT

## 2021-01-13 ENCOUNTER — APPOINTMENT (OUTPATIENT)
Dept: OCCUPATIONAL MEDICINE | Facility: OTHER | Age: 46
End: 2021-01-13

## 2021-01-15 ENCOUNTER — APPOINTMENT (OUTPATIENT)
Dept: OCCUPATIONAL MEDICINE | Facility: OTHER | Age: 46
End: 2021-01-15

## 2021-01-21 ENCOUNTER — APPOINTMENT (OUTPATIENT)
Dept: OCCUPATIONAL MEDICINE | Facility: OTHER | Age: 46
End: 2021-01-21

## 2021-11-24 ENCOUNTER — HOSPITAL ENCOUNTER (EMERGENCY)
Facility: HOSPITAL | Age: 46
Discharge: HOME | End: 2021-11-24
Attending: NURSE PRACTITIONER | Admitting: NURSE PRACTITIONER
Payer: COMMERCIAL

## 2021-11-24 VITALS
HEART RATE: 86 BPM | RESPIRATION RATE: 16 BRPM | TEMPERATURE: 97.7 F | DIASTOLIC BLOOD PRESSURE: 67 MMHG | SYSTOLIC BLOOD PRESSURE: 119 MMHG | OXYGEN SATURATION: 96 %
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
## 2021-11-24 DIAGNOSIS — S16.1XXA STRAIN OF NECK MUSCLE, INITIAL ENCOUNTER: ICD-10-CM

## 2021-11-24 PROCEDURE — 99213 OFFICE O/P EST LOW 20 MIN: CPT | Performed by: NURSE PRACTITIONER

## 2021-11-24 PROCEDURE — G0463 HOSPITAL OUTPT CLINIC VISIT: HCPCS

## 2021-11-24 ASSESSMENT — ENCOUNTER SYMPTOMS
DIZZINESS: 1
HEADACHES: 0
ARTHRALGIAS: 0
PHOTOPHOBIA: 0
APPETITE CHANGE: 0
NUMBNESS: 0
NECK PAIN: 1
MYALGIAS: 1
BACK PAIN: 0
WEAKNESS: 0
SHORTNESS OF BREATH: 0
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
## 2021-11-24 NOTE — DISCHARGE INSTRUCTIONS
Take tylenol or ibuprofen as needed for pain.     Apply ice packs alternating with heat to your neck    Follow up with your doctor if no improvement in symptoms.    Return to emergency department for worsening or concerning symptoms.

## 2021-11-24 NOTE — Clinical Note
Jarett Duenas was seen and treated in our emergency department on 11/24/2021.  He may return to work on 11/25/2021.       If you have any questions or concerns, please don't hesitate to call.      Mpofu, Prudence, CNP
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
## 2022-07-15 ENCOUNTER — HOSPITAL ENCOUNTER (EMERGENCY)
Facility: HOSPITAL | Age: 47
Discharge: HOME | End: 2022-07-15
Attending: NURSE PRACTITIONER | Admitting: NURSE PRACTITIONER
Payer: COMMERCIAL

## 2022-07-15 VITALS
TEMPERATURE: 98 F | WEIGHT: 156.53 LBS | RESPIRATION RATE: 14 BRPM | SYSTOLIC BLOOD PRESSURE: 153 MMHG | DIASTOLIC BLOOD PRESSURE: 86 MMHG | OXYGEN SATURATION: 97 % | HEART RATE: 70 BPM

## 2022-07-15 DIAGNOSIS — S51.812A LACERATION OF LEFT FOREARM, INITIAL ENCOUNTER: Primary | ICD-10-CM

## 2022-07-15 PROCEDURE — 999N000104 HC STATISTIC NO CHARGE

## 2022-07-15 PROCEDURE — 12004 RPR S/N/AX/GEN/TRK7.6-12.5CM: CPT | Performed by: NURSE PRACTITIONER

## 2022-07-15 PROCEDURE — 90471 IMMUNIZATION ADMIN: CPT | Performed by: NURSE PRACTITIONER

## 2022-07-15 PROCEDURE — 90715 TDAP VACCINE 7 YRS/> IM: CPT | Performed by: NURSE PRACTITIONER

## 2022-07-15 PROCEDURE — 250N000011 HC RX IP 250 OP 636: Performed by: NURSE PRACTITIONER

## 2022-07-15 RX ADMIN — CLOSTRIDIUM TETANI TOXOID ANTIGEN (FORMALDEHYDE INACTIVATED), CORYNEBACTERIUM DIPHTHERIAE TOXOID ANTIGEN (FORMALDEHYDE INACTIVATED), BORDETELLA PERTUSSIS TOXOID ANTIGEN (GLUTARALDEHYDE INACTIVATED), BORDETELLA PERTUSSIS FILAMENTOUS HEMAGGLUTININ ANTIGEN (FORMALDEHYDE INACTIVATED), BORDETELLA PERTUSSIS PERTACTIN ANTIGEN, AND BORDETELLA PERTUSSIS FIMBRIAE 2/3 ANTIGEN 0.5 ML: 5; 2; 2.5; 5; 3; 5 INJECTION, SUSPENSION INTRAMUSCULAR at 13:47

## 2022-07-15 ASSESSMENT — ENCOUNTER SYMPTOMS: WOUND: 1

## 2022-07-15 NOTE — ED TRIAGE NOTES
Pt presents with a laceration to his left forearm while he was using a screwdriver and it slipped and cut his forearm.Laceration is 3 inches long.

## 2022-07-15 NOTE — DISCHARGE INSTRUCTIONS
Keep the dressing in place and avoid getting wound wet for the first 24 hours.  After that you are okay to wash your arm and get wound wet.      Do daily dressing changes.    Observe for any signs of infection such as redness or abnormal discharge and return here for reevaluation.    Go to your doctor or return here in 7 to 10 days to get the stitches removed.

## 2022-07-15 NOTE — ED PROVIDER NOTES
History     Chief Complaint   Patient presents with     Laceration     HPI  Jarett Duenas is a 47 year old male who presents  to urgent care for evaluation of a laceration.  The patient tells me that he was working on a  trying to pry something open using a screwdriver when the screwdriver slipped and he accidentally cut himself to his left forearm.  He notes that he is bleeding pretty good.  No blood thinner use.  He can still move his fingers with minimal difficulty.  No paresthesias.  Last Tdap was 2015.    He is left-hand dominant.    Allergies:  No Known Allergies    Problem List:    Patient Active Problem List    Diagnosis Date Noted     CARDIOVASCULAR SCREENING; LDL GOAL LESS THAN 160 10/31/2010     Priority: Medium     Sprain of thoracic region 01/02/2006     Priority: Medium        Past Medical History:    Past Medical History:   Diagnosis Date     NO ACTIVE PROBLEMS        Past Surgical History:    No past surgical history on file.    Family History:    No family history on file.    Social History:  Marital Status:  Single [1]  Social History     Tobacco Use     Smoking status: Current Every Day Smoker     Packs/day: 0.50     Years: 17.00     Pack years: 8.50     Types: Cigarettes     Smokeless tobacco: Never Used   Substance Use Topics     Alcohol use: Yes     Comment: last drink; 0400     Drug use: Yes     Types: Marijuana     Comment: occasional        Medications:    IBUPROFEN PO  levETIRAcetam (KEPPRA) 500 MG tablet          Review of Systems   Skin: Positive for wound.   All other systems reviewed and are negative.      Physical Exam   BP: 153/86  Pulse: 70  Temp: 98  F (36.7  C)  Resp: 14  Weight: 71 kg (156 lb 8.4 oz)  SpO2: 97 %      Physical Exam  Vitals and nursing note reviewed.   Constitutional:       Appearance: Normal appearance. He is not ill-appearing or toxic-appearing.   HENT:      Head: Atraumatic.   Eyes:      Pupils: Pupils are equal, round, and reactive to light.    Cardiovascular:      Rate and Rhythm: Normal rate.   Pulmonary:      Effort: Pulmonary effort is normal.   Musculoskeletal:         General: Signs of injury present. Normal range of motion.      Left forearm: Laceration present.        Arms:       Cervical back: Neck supple.      Comments: Approximately 10 cm flap laceration slowly oozing blood.   Skin:     General: Skin is warm and dry.      Capillary Refill: Capillary refill takes less than 2 seconds.   Neurological:      Mental Status: He is alert and oriented to person, place, and time.         ED Course                 [facility]    -Laceration Repair    Date/Time: 7/15/2022 2:10 PM  Performed by: America Hernandez CNP  Authorized by: America Hernandez CNP     Risks, benefits and alternatives discussed.      ANESTHESIA (see MAR for exact dosages):     Anesthesia method:  Local infiltration    Local anesthetic:  Lidocaine 1% WITH epi  LACERATION DETAILS     Location:  Shoulder/arm    Shoulder/arm location:  L lower arm    Length (cm):  10    REPAIR TYPE:     Repair type:  Simple      EXPLORATION:     Hemostasis achieved with:  LET    Wound exploration: wound explored through full range of motion and entire depth of wound probed and visualized      Wound extent: muscle damage and vascular damage      Wound extent: fascia not violated, no foreign body, no nerve damage, no tendon damage and no underlying fracture      Contaminated: no      TREATMENT:     Area cleansed with:  Hibiclens    Amount of cleaning:  Standard    Visualized foreign bodies/material removed: no      SKIN REPAIR     Repair method:  Sutures    Suture size:  4-0    Suture technique:  Simple interrupted    Number of sutures:  12    APPROXIMATION     Approximation:  Close    POST-PROCEDURE DETAILS     Dressing:  Antibiotic ointment and non-adherent dressing        PROCEDURE    Patient Tolerance:  Patient tolerated the procedure well with no immediate complications              No  results found for this or any previous visit (from the past 24 hour(s)).    Medications   Tdap (tetanus-diphtheria-acell pertussis) (ADACEL) injection 0.5 mL (0.5 mLs Intramuscular Given 7/15/22 1347)       Assessments & Plan (with Medical Decision Making)     I have reviewed the nursing notes.    47-year-old male that presented for evaluation of an approximately 10 cm flap laceration to distal forearm after a screwdriver that he was using accidentally slipped.  Laceration repair was done (see procedure note above).  Patient tolerated well.  Tdap updated today.    Dressing applied and patient advised to avoid getting wound wet or removing dressing for the next 24 hours.  After that may do daily dressing changes.  Observe for any signs of infection and return here for reevaluation.  Go to PCP or return here in 7 to 10 days for suture removal.    I have reviewed the findings, diagnosis, plan and need for follow up with the patient.  This document was prepared using a combination of typing and voice generated software.  While every attempt was made for accuracy, spelling and grammatical errors may exist.    New Prescriptions    No medications on file       Final diagnoses:   Laceration of left forearm, initial encounter       7/15/2022   [facility]     Mpofu, Prudence, CNP  07/15/22 8374

## 2022-12-26 ENCOUNTER — HOSPITAL ENCOUNTER (EMERGENCY)
Facility: HOSPITAL | Age: 47
Discharge: HOME | End: 2022-12-26
Attending: PHYSICIAN ASSISTANT | Admitting: PHYSICIAN ASSISTANT
Payer: COMMERCIAL

## 2022-12-26 VITALS
RESPIRATION RATE: 16 BRPM | HEART RATE: 70 BPM | TEMPERATURE: 98.6 F | OXYGEN SATURATION: 96 % | DIASTOLIC BLOOD PRESSURE: 91 MMHG | SYSTOLIC BLOOD PRESSURE: 146 MMHG

## 2022-12-26 DIAGNOSIS — K04.7 DENTAL INFECTION: ICD-10-CM

## 2022-12-26 LAB — GROUP A STREP BY PCR: NOT DETECTED

## 2022-12-26 PROCEDURE — 99213 OFFICE O/P EST LOW 20 MIN: CPT | Performed by: PHYSICIAN ASSISTANT

## 2022-12-26 PROCEDURE — G0463 HOSPITAL OUTPT CLINIC VISIT: HCPCS

## 2022-12-26 PROCEDURE — 87651 STREP A DNA AMP PROBE: CPT | Performed by: PHYSICIAN ASSISTANT

## 2022-12-26 RX ORDER — HYDROCODONE BITARTRATE AND ACETAMINOPHEN 5; 325 MG/1; MG/1
1 TABLET ORAL EVERY 6 HOURS PRN
Qty: 10 TABLET | Refills: 0 | Status: SHIPPED | OUTPATIENT
Start: 2022-12-26 | End: 2024-04-26

## 2022-12-26 RX ORDER — AMOXICILLIN 875 MG
875 TABLET ORAL 2 TIMES DAILY
Qty: 20 TABLET | Refills: 0 | Status: SHIPPED | OUTPATIENT
Start: 2022-12-26 | End: 2023-01-05

## 2022-12-26 NOTE — Clinical Note
Jarett Duenas was seen and treated in our emergency department on 12/26/2022.  He may return to work on 12/28/2022.  No work today or tomorrow due to illness.      If you have any questions or concerns, please don't hesitate to call.      Jennifer Matute PA-C

## 2022-12-27 NOTE — ED PROVIDER NOTES
History     Chief Complaint   Patient presents with     Dental Pain     Pharyngitis     HPI  Jarett Duenas is a 47 year old male who presents with right upper molar pain since this am with facial swelling. No difficulty swallowing or breathing. No fevers.     Allergies:  No Known Allergies    Problem List:    Patient Active Problem List    Diagnosis Date Noted     CARDIOVASCULAR SCREENING; LDL GOAL LESS THAN 160 10/31/2010     Priority: Medium     Sprain of thoracic region 01/02/2006     Priority: Medium        Past Medical History:    Past Medical History:   Diagnosis Date     NO ACTIVE PROBLEMS        Past Surgical History:    No past surgical history on file.    Family History:    No family history on file.    Social History:  Marital Status:  Single [1]  Social History     Tobacco Use     Smoking status: Every Day     Packs/day: 0.50     Years: 17.00     Pack years: 8.50     Types: Cigarettes     Smokeless tobacco: Never   Substance Use Topics     Alcohol use: Yes     Comment: last drink; 0400     Drug use: Yes     Types: Marijuana     Comment: occasional        Medications:    amoxicillin (AMOXIL) 875 MG tablet  HYDROcodone-acetaminophen (NORCO) 5-325 MG tablet  IBUPROFEN PO  levETIRAcetam (KEPPRA) 500 MG tablet          Review of Systems   All other systems reviewed and are negative.      Physical Exam   BP: 146/91  Pulse: 70  Temp: 98.6  F (37  C)  Resp: 16  SpO2: 96 %      Physical Exam  Vitals and nursing note reviewed.   Constitutional:       Appearance: Normal appearance.   HENT:      Head: Normocephalic and atraumatic.      Nose: Nose normal.      Mouth/Throat:      Mouth: Mucous membranes are moist.      Dentition: Dental tenderness, gingival swelling and dental caries present.      Pharynx: Oropharynx is clear. Uvula midline. No pharyngeal swelling or uvula swelling.     Eyes:      Conjunctiva/sclera: Conjunctivae normal.      Pupils: Pupils are equal, round, and reactive to light.    Cardiovascular:      Rate and Rhythm: Normal rate.      Pulses: Normal pulses.   Pulmonary:      Effort: Pulmonary effort is normal.   Musculoskeletal:         General: Normal range of motion.      Cervical back: Normal range of motion and neck supple.   Lymphadenopathy:      Cervical: No cervical adenopathy.   Skin:     General: Skin is warm.      Capillary Refill: Capillary refill takes less than 2 seconds.   Neurological:      General: No focal deficit present.      Mental Status: He is alert.   Psychiatric:         Mood and Affect: Mood normal.         ED Course                 Procedures              No results found for this or any previous visit (from the past 24 hour(s)).    Medications - No data to display    Assessments & Plan (with Medical Decision Making)   Pt with right upper molar dental infection. Non-toxic. NAD. No fluctuance. Mild facial swelling. RX for Amoxicillin was provided as well as [place] for pain. He was discharged home in good condition following.     Plan:   Follow up with a dentist ASAP.  Take the Amoxicillin as prescribed for your dental infection.  Take the Norco as prescribed for pain not relieved by tylenol/ibuprofen. No driving or drinking while taking this medication.   Alternate between Ibuprofen and Tylenol every 4 hours for pain control.  Return here with any new/concerning symptoms.     I have reviewed the nursing notes.    I have reviewed the findings, diagnosis, plan and need for follow up with the patient.    New Prescriptions    AMOXICILLIN (AMOXIL) 875 MG TABLET    Take 1 tablet (875 mg) by mouth 2 times daily for 10 days    HYDROCODONE-ACETAMINOPHEN (NORCO) 5-325 MG TABLET    Take 1 tablet by mouth every 6 hours as needed for severe pain (7-10)       Final diagnoses:   Dental infection       12/26/2022   [facility]

## 2022-12-27 NOTE — DISCHARGE INSTRUCTIONS
Follow up with a dentist ASAP.  Take the Amoxicillin as prescribed for your dental infection.  Take the Norco as prescribed for pain not relieved by tylenol/ibuprofen. No driving or drinking while taking this medication.   Alternate between Ibuprofen and Tylenol every 4 hours for pain control.  Return here with any new/concerning symptoms.

## 2024-04-26 ENCOUNTER — HOSPITAL ENCOUNTER (EMERGENCY)
Facility: HOSPITAL | Age: 49
Discharge: HOME | End: 2024-04-26
Attending: NURSE PRACTITIONER | Admitting: NURSE PRACTITIONER
Payer: COMMERCIAL

## 2024-04-26 VITALS
RESPIRATION RATE: 20 BRPM | DIASTOLIC BLOOD PRESSURE: 83 MMHG | OXYGEN SATURATION: 97 % | SYSTOLIC BLOOD PRESSURE: 139 MMHG | TEMPERATURE: 97.6 F | HEART RATE: 69 BPM | WEIGHT: 155 LBS

## 2024-04-26 DIAGNOSIS — M54.42 ACUTE LEFT-SIDED LOW BACK PAIN WITH LEFT-SIDED SCIATICA: Primary | ICD-10-CM

## 2024-04-26 DIAGNOSIS — S39.012A STRAIN OF LUMBAR REGION, INITIAL ENCOUNTER: ICD-10-CM

## 2024-04-26 PROCEDURE — 250N000013 HC RX MED GY IP 250 OP 250 PS 637: Performed by: NURSE PRACTITIONER

## 2024-04-26 PROCEDURE — 99213 OFFICE O/P EST LOW 20 MIN: CPT | Performed by: NURSE PRACTITIONER

## 2024-04-26 PROCEDURE — 250N000011 HC RX IP 250 OP 636: Performed by: NURSE PRACTITIONER

## 2024-04-26 PROCEDURE — 96372 THER/PROPH/DIAG INJ SC/IM: CPT | Performed by: NURSE PRACTITIONER

## 2024-04-26 PROCEDURE — G0463 HOSPITAL OUTPT CLINIC VISIT: HCPCS | Mod: 25

## 2024-04-26 RX ORDER — LIDOCAINE 4 G/G
1 PATCH TOPICAL ONCE
Status: DISCONTINUED | OUTPATIENT
Start: 2024-04-26 | End: 2024-04-26 | Stop reason: HOSPADM

## 2024-04-26 RX ORDER — CYCLOBENZAPRINE HCL 5 MG
10 TABLET ORAL 3 TIMES DAILY PRN
Qty: 21 TABLET | Refills: 0 | Status: SHIPPED | OUTPATIENT
Start: 2024-04-26

## 2024-04-26 RX ORDER — PREDNISONE 20 MG/1
TABLET ORAL
Qty: 10 TABLET | Refills: 0 | Status: SHIPPED | OUTPATIENT
Start: 2024-04-26

## 2024-04-26 RX ORDER — KETOROLAC TROMETHAMINE 30 MG/ML
30 INJECTION, SOLUTION INTRAMUSCULAR; INTRAVENOUS ONCE
Status: COMPLETED | OUTPATIENT
Start: 2024-04-26 | End: 2024-04-26

## 2024-04-26 RX ADMIN — KETOROLAC TROMETHAMINE 30 MG: 30 INJECTION, SOLUTION INTRAMUSCULAR at 11:23

## 2024-04-26 RX ADMIN — LIDOCAINE 1 PATCH: 4 PATCH TOPICAL at 11:23

## 2024-04-26 ASSESSMENT — ACTIVITIES OF DAILY LIVING (ADL): ADLS_ACUITY_SCORE: 35

## 2024-04-26 ASSESSMENT — ENCOUNTER SYMPTOMS: BACK PAIN: 1

## 2024-04-26 NOTE — DISCHARGE INSTRUCTIONS
Take the steroid and muscle relaxant as prescribed.  Tylenol or ibuprofen as needed for pain.  Apply ice packs 20 minutes at a time to your back and may alternate with heat. Do the low back stretches as illustrated on the attached instructions.  May also use the tennis ball to massage the area.    Follow-up with your primary doctor if no improvement in symptoms.    Return to urgent care or emergency room for any worsening or concerning symptoms.
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
## 2024-04-26 NOTE — ED TRIAGE NOTES
Pt presents with c/o having increased lower back pain  Reports that has a hx of lower back pain but reports overused back and is now having increased pain again  S/x started up again 3 days ago   Pt reports had ibu and tylenol last at 0830

## 2024-12-17 ENCOUNTER — APPOINTMENT (OUTPATIENT)
Dept: CT IMAGING | Facility: HOSPITAL | Age: 49
End: 2024-12-17
Attending: EMERGENCY MEDICINE
Payer: COMMERCIAL

## 2024-12-17 ENCOUNTER — HOSPITAL ENCOUNTER (EMERGENCY)
Facility: HOSPITAL | Age: 49
Discharge: OTHER INSTITUTION | End: 2024-12-17
Attending: EMERGENCY MEDICINE
Payer: COMMERCIAL

## 2024-12-17 ENCOUNTER — APPOINTMENT (OUTPATIENT)
Dept: GENERAL RADIOLOGY | Facility: HOSPITAL | Age: 49
End: 2024-12-17
Attending: EMERGENCY MEDICINE
Payer: COMMERCIAL

## 2024-12-17 VITALS
WEIGHT: 149.47 LBS | OXYGEN SATURATION: 96 % | DIASTOLIC BLOOD PRESSURE: 81 MMHG | TEMPERATURE: 99.9 F | RESPIRATION RATE: 14 BRPM | HEART RATE: 71 BPM | SYSTOLIC BLOOD PRESSURE: 130 MMHG

## 2024-12-17 DIAGNOSIS — G40.901 STATUS EPILEPTICUS (H): ICD-10-CM

## 2024-12-17 DIAGNOSIS — S01.81XA FACIAL LACERATION, INITIAL ENCOUNTER: ICD-10-CM

## 2024-12-17 LAB
ABO + RH BLD: NORMAL
ALBUMIN SERPL BCG-MCNC: 4.5 G/DL (ref 3.5–5.2)
ALBUMIN UR-MCNC: 30 MG/DL
ALP SERPL-CCNC: 64 U/L (ref 40–150)
ALT SERPL W P-5'-P-CCNC: 48 U/L (ref 0–70)
AMPHETAMINES UR QL SCN: ABNORMAL
ANION GAP SERPL CALCULATED.3IONS-SCNC: 22 MMOL/L (ref 7–15)
APPEARANCE CSF: CLEAR
APPEARANCE UR: ABNORMAL
APTT PPP: 24 SECONDS (ref 22–38)
AST SERPL W P-5'-P-CCNC: 100 U/L (ref 0–45)
ATRIAL RATE - MUSE: 69 BPM
BACTERIA #/AREA URNS HPF: ABNORMAL /HPF
BARBITURATES UR QL SCN: ABNORMAL
BASE EXCESS BLDV CALC-SCNC: -7.3 MMOL/L (ref -3–3)
BASOPHILS # BLD AUTO: 0.1 10E3/UL (ref 0–0.2)
BASOPHILS NFR BLD AUTO: 0 %
BENZODIAZ UR QL SCN: ABNORMAL
BILIRUB SERPL-MCNC: 0.7 MG/DL
BILIRUB UR QL STRIP: NEGATIVE
BLD GP AB SCN SERPL QL: NEGATIVE
BUN SERPL-MCNC: 24.6 MG/DL (ref 6–20)
BZE UR QL SCN: ABNORMAL
C GATTII+NEOFOR DNA CSF QL NAA+NON-PROBE: NEGATIVE
CALCIUM SERPL-MCNC: 9 MG/DL (ref 8.8–10.4)
CANNABINOIDS UR QL SCN: ABNORMAL
CAOX CRY #/AREA URNS HPF: ABNORMAL /HPF
CHLORIDE SERPL-SCNC: 94 MMOL/L (ref 98–107)
CK SERPL-CCNC: 2400 U/L (ref 39–308)
CMV DNA CSF QL NAA+NON-PROBE: NEGATIVE
COLOR CSF: COLORLESS
COLOR UR AUTO: ABNORMAL
CREAT SERPL-MCNC: 1.19 MG/DL (ref 0.67–1.17)
DIASTOLIC BLOOD PRESSURE - MUSE: NORMAL MMHG
E COLI K1 AG CSF QL: NEGATIVE
EGFRCR SERPLBLD CKD-EPI 2021: 74 ML/MIN/1.73M2
EOSINOPHIL # BLD AUTO: 0.2 10E3/UL (ref 0–0.7)
EOSINOPHIL NFR BLD AUTO: 1 %
ERYTHROCYTE [DISTWIDTH] IN BLOOD BY AUTOMATED COUNT: 13.5 % (ref 10–15)
ETHANOL SERPL-MCNC: 0.01 G/DL
EV RNA SPEC QL NAA+PROBE: NEGATIVE
FENTANYL UR QL: ABNORMAL
FLUAV RNA SPEC QL NAA+PROBE: NEGATIVE
FLUBV RNA RESP QL NAA+PROBE: NEGATIVE
GLUCOSE CSF-MCNC: 90 MG/DL (ref 40–70)
GLUCOSE SERPL-MCNC: 140 MG/DL (ref 70–99)
GLUCOSE UR STRIP-MCNC: NEGATIVE MG/DL
GP B STREP DNA CSF QL NAA+NON-PROBE: NEGATIVE
HAEM INFLU DNA CSF QL NAA+NON-PROBE: NEGATIVE
HCO3 BLDV-SCNC: 18 MMOL/L (ref 21–28)
HCO3 SERPL-SCNC: 19 MMOL/L (ref 22–29)
HCT VFR BLD AUTO: 40.8 % (ref 40–53)
HGB BLD-MCNC: 13.9 G/DL (ref 13.3–17.7)
HGB UR QL STRIP: ABNORMAL
HHV6 DNA CSF QL NAA+NON-PROBE: NEGATIVE
HSV1 DNA CSF QL NAA+NON-PROBE: NEGATIVE
HSV2 DNA CSF QL NAA+NON-PROBE: NEGATIVE
HYALINE CASTS: 5 /LPF
IMM GRANULOCYTES # BLD: 0.1 10E3/UL
IMM GRANULOCYTES NFR BLD: 1 %
INR PPP: 1.08 (ref 0.85–1.15)
INTERPRETATION ECG - MUSE: NORMAL
KETONES UR STRIP-MCNC: 10 MG/DL
L MONOCYTOG DNA CSF QL NAA+NON-PROBE: NEGATIVE
LEUKOCYTE ESTERASE UR QL STRIP: NEGATIVE
LYMPHOCYTES # BLD AUTO: 0.6 10E3/UL (ref 0.8–5.3)
LYMPHOCYTES NFR BLD AUTO: 3 %
MCH RBC QN AUTO: 32.3 PG (ref 26.5–33)
MCHC RBC AUTO-ENTMCNC: 34.1 G/DL (ref 31.5–36.5)
MCV RBC AUTO: 95 FL (ref 78–100)
MONOCYTES # BLD AUTO: 1.9 10E3/UL (ref 0–1.3)
MONOCYTES NFR BLD AUTO: 9 %
MUCOUS THREADS #/AREA URNS LPF: PRESENT /LPF
N MEN DNA CSF QL NAA+NON-PROBE: NEGATIVE
NEUTROPHILS # BLD AUTO: 19.4 10E3/UL (ref 1.6–8.3)
NEUTROPHILS NFR BLD AUTO: 87 %
NITRATE UR QL: NEGATIVE
NRBC # BLD AUTO: 0 10E3/UL
NRBC BLD AUTO-RTO: 0 /100
O2/TOTAL GAS SETTING VFR VENT: 100 %
OPIATES UR QL SCN: ABNORMAL
OTHER CELLS CSF: 7 %
OTHER CELLS CSF: 93 %
OXYHGB MFR BLDV: 97 % (ref 70–75)
P AXIS - MUSE: 55 DEGREES
PARECHOVIRUS A RNA CSF QL NAA+NON-PROBE: NEGATIVE
PCO2 BLDV: 37 MM HG (ref 40–50)
PCP QUAL URINE (ROCHE): ABNORMAL
PH BLDV: 7.3 [PH] (ref 7.32–7.43)
PH UR STRIP: 6 [PH] (ref 4.7–8)
PLAT MORPH BLD: NORMAL
PLATELET # BLD AUTO: 231 10E3/UL (ref 150–450)
PO2 BLDV: 121 MM HG (ref 25–47)
POTASSIUM SERPL-SCNC: 4.1 MMOL/L (ref 3.4–5.3)
PR INTERVAL - MUSE: 140 MS
PROT CSF-MCNC: 38.1 MG/DL (ref 15–45)
PROT SERPL-MCNC: 6.5 G/DL (ref 6.4–8.3)
QRS DURATION - MUSE: 94 MS
QT - MUSE: 384 MS
QTC - MUSE: 411 MS
R AXIS - MUSE: 92 DEGREES
RBC # BLD AUTO: 4.3 10E6/UL (ref 4.4–5.9)
RBC # CSF MANUAL: 110 /UL (ref 0–2)
RBC MORPH BLD: NORMAL
RBC URINE: 65 /HPF
RSV RNA SPEC NAA+PROBE: NEGATIVE
S PNEUM DNA CSF QL NAA+NON-PROBE: NEGATIVE
SAO2 % BLDV: 98.2 % (ref 70–75)
SARS-COV-2 RNA RESP QL NAA+PROBE: NEGATIVE
SODIUM SERPL-SCNC: 135 MMOL/L (ref 135–145)
SP GR UR STRIP: 1 (ref 1–1.03)
SPECIMEN EXP DATE BLD: NORMAL
SQUAMOUS EPITHELIAL: 0 /HPF
SYSTOLIC BLOOD PRESSURE - MUSE: NORMAL MMHG
T AXIS - MUSE: 81 DEGREES
TUBE # CSF: 3
UROBILINOGEN UR STRIP-MCNC: NORMAL MG/DL
VENTRICULAR RATE- MUSE: 69 BPM
VZV DNA CSF QL NAA+NON-PROBE: NEGATIVE
WBC # BLD AUTO: 22.2 10E3/UL (ref 4–11)
WBC # CSF MANUAL: 125 /UL (ref 0–5)
WBC URINE: 0 /HPF

## 2024-12-17 PROCEDURE — 85025 COMPLETE CBC W/AUTO DIFF WBC: CPT | Performed by: EMERGENCY MEDICINE

## 2024-12-17 PROCEDURE — 82550 ASSAY OF CK (CPK): CPT | Performed by: EMERGENCY MEDICINE

## 2024-12-17 PROCEDURE — 87070 CULTURE OTHR SPECIMN AEROBIC: CPT | Performed by: EMERGENCY MEDICINE

## 2024-12-17 PROCEDURE — G0390 TRAUMA RESPONS W/HOSP CRITI: HCPCS

## 2024-12-17 PROCEDURE — 96365 THER/PROPH/DIAG IV INF INIT: CPT | Mod: XU

## 2024-12-17 PROCEDURE — 82805 BLOOD GASES W/O2 SATURATION: CPT | Performed by: EMERGENCY MEDICINE

## 2024-12-17 PROCEDURE — 86900 BLOOD TYPING SEROLOGIC ABO: CPT | Performed by: EMERGENCY MEDICINE

## 2024-12-17 PROCEDURE — 93005 ELECTROCARDIOGRAM TRACING: CPT

## 2024-12-17 PROCEDURE — 84157 ASSAY OF PROTEIN OTHER: CPT | Performed by: EMERGENCY MEDICINE

## 2024-12-17 PROCEDURE — 250N000009 HC RX 250: Performed by: EMERGENCY MEDICINE

## 2024-12-17 PROCEDURE — 82077 ASSAY SPEC XCP UR&BREATH IA: CPT | Performed by: EMERGENCY MEDICINE

## 2024-12-17 PROCEDURE — 96372 THER/PROPH/DIAG INJ SC/IM: CPT | Performed by: EMERGENCY MEDICINE

## 2024-12-17 PROCEDURE — 82374 ASSAY BLOOD CARBON DIOXIDE: CPT | Performed by: EMERGENCY MEDICINE

## 2024-12-17 PROCEDURE — 258N000003 HC RX IP 258 OP 636: Performed by: EMERGENCY MEDICINE

## 2024-12-17 PROCEDURE — 999N000065 XR CHEST PORT 1 VIEW

## 2024-12-17 PROCEDURE — 80307 DRUG TEST PRSMV CHEM ANLYZR: CPT | Performed by: EMERGENCY MEDICINE

## 2024-12-17 PROCEDURE — 99291 CRITICAL CARE FIRST HOUR: CPT | Mod: 25 | Performed by: EMERGENCY MEDICINE

## 2024-12-17 PROCEDURE — 12051 INTMD RPR FACE/MM 2.5 CM/<: CPT

## 2024-12-17 PROCEDURE — 87040 BLOOD CULTURE FOR BACTERIA: CPT | Performed by: EMERGENCY MEDICINE

## 2024-12-17 PROCEDURE — 99291 CRITICAL CARE FIRST HOUR: CPT | Mod: 25

## 2024-12-17 PROCEDURE — 82945 GLUCOSE OTHER FLUID: CPT | Performed by: EMERGENCY MEDICINE

## 2024-12-17 PROCEDURE — 85610 PROTHROMBIN TIME: CPT | Performed by: EMERGENCY MEDICINE

## 2024-12-17 PROCEDURE — 89051 BODY FLUID CELL COUNT: CPT | Performed by: EMERGENCY MEDICINE

## 2024-12-17 PROCEDURE — 80048 BASIC METABOLIC PNL TOTAL CA: CPT | Performed by: EMERGENCY MEDICINE

## 2024-12-17 PROCEDURE — 62270 DX LMBR SPI PNXR: CPT

## 2024-12-17 PROCEDURE — 70450 CT HEAD/BRAIN W/O DYE: CPT

## 2024-12-17 PROCEDURE — 81003 URINALYSIS AUTO W/O SCOPE: CPT | Performed by: EMERGENCY MEDICINE

## 2024-12-17 PROCEDURE — 250N000011 HC RX IP 250 OP 636: Performed by: EMERGENCY MEDICINE

## 2024-12-17 PROCEDURE — 36415 COLL VENOUS BLD VENIPUNCTURE: CPT | Performed by: EMERGENCY MEDICINE

## 2024-12-17 PROCEDURE — 999N000158 HC STATISTIC RCP TIME ED VENT EA 10 MIN

## 2024-12-17 PROCEDURE — 87483 CNS DNA AMP PROBE TYPE 12-25: CPT | Performed by: EMERGENCY MEDICINE

## 2024-12-17 PROCEDURE — 99292 CRITICAL CARE ADDL 30 MIN: CPT

## 2024-12-17 PROCEDURE — 62270 DX LMBR SPI PNXR: CPT | Performed by: EMERGENCY MEDICINE

## 2024-12-17 PROCEDURE — 96368 THER/DIAG CONCURRENT INF: CPT | Mod: XU

## 2024-12-17 PROCEDURE — 93010 ELECTROCARDIOGRAM REPORT: CPT | Performed by: INTERNAL MEDICINE

## 2024-12-17 PROCEDURE — 31500 INSERT EMERGENCY AIRWAY: CPT

## 2024-12-17 PROCEDURE — 72125 CT NECK SPINE W/O DYE: CPT

## 2024-12-17 PROCEDURE — 94002 VENT MGMT INPAT INIT DAY: CPT

## 2024-12-17 PROCEDURE — 999N000157 HC STATISTIC RCP TIME EA 10 MIN

## 2024-12-17 PROCEDURE — 96375 TX/PRO/DX INJ NEW DRUG ADDON: CPT | Mod: XU

## 2024-12-17 PROCEDURE — 85730 THROMBOPLASTIN TIME PARTIAL: CPT | Performed by: EMERGENCY MEDICINE

## 2024-12-17 PROCEDURE — 87637 SARSCOV2&INF A&B&RSV AMP PRB: CPT | Performed by: EMERGENCY MEDICINE

## 2024-12-17 PROCEDURE — 71260 CT THORAX DX C+: CPT

## 2024-12-17 PROCEDURE — 12051 INTMD RPR FACE/MM 2.5 CM/<: CPT | Mod: XU | Performed by: EMERGENCY MEDICINE

## 2024-12-17 PROCEDURE — 31500 INSERT EMERGENCY AIRWAY: CPT | Performed by: EMERGENCY MEDICINE

## 2024-12-17 RX ORDER — PROPOFOL 10 MG/ML
INJECTION, EMULSION INTRAVENOUS
Status: COMPLETED
Start: 2024-12-17 | End: 2024-12-17

## 2024-12-17 RX ORDER — LIDOCAINE 40 MG/G
CREAM TOPICAL
Status: DISCONTINUED | OUTPATIENT
Start: 2024-12-17 | End: 2024-12-17 | Stop reason: HOSPADM

## 2024-12-17 RX ORDER — CEFTRIAXONE SODIUM 2 G/50ML
2 INJECTION, SOLUTION INTRAVENOUS ONCE
Status: COMPLETED | OUTPATIENT
Start: 2024-12-17 | End: 2024-12-17

## 2024-12-17 RX ORDER — DROPERIDOL 2.5 MG/ML
10 INJECTION, SOLUTION INTRAMUSCULAR; INTRAVENOUS ONCE
Status: COMPLETED | OUTPATIENT
Start: 2024-12-17 | End: 2024-12-17

## 2024-12-17 RX ORDER — IOPAMIDOL 755 MG/ML
76 INJECTION, SOLUTION INTRAVASCULAR ONCE
Status: COMPLETED | OUTPATIENT
Start: 2024-12-17 | End: 2024-12-17

## 2024-12-17 RX ORDER — PROPOFOL 10 MG/ML
5-75 INJECTION, EMULSION INTRAVENOUS CONTINUOUS
Status: DISCONTINUED | OUTPATIENT
Start: 2024-12-17 | End: 2024-12-17 | Stop reason: HOSPADM

## 2024-12-17 RX ORDER — LEVETIRACETAM 500 MG/5ML
INJECTION, SOLUTION, CONCENTRATE INTRAVENOUS
Status: DISCONTINUED
Start: 2024-12-17 | End: 2024-12-17 | Stop reason: HOSPADM

## 2024-12-17 RX ORDER — LORAZEPAM 2 MG/ML
2 INJECTION INTRAMUSCULAR ONCE
Status: COMPLETED | OUTPATIENT
Start: 2024-12-17 | End: 2024-12-17

## 2024-12-17 RX ORDER — ETOMIDATE 2 MG/ML
20 INJECTION INTRAVENOUS ONCE
Status: COMPLETED | OUTPATIENT
Start: 2024-12-17 | End: 2024-12-17

## 2024-12-17 RX ORDER — DIAZEPAM 10 MG/2ML
10 INJECTION, SOLUTION INTRAMUSCULAR; INTRAVENOUS ONCE
Status: COMPLETED | OUTPATIENT
Start: 2024-12-17 | End: 2024-12-17

## 2024-12-17 RX ADMIN — PROPOFOL: 10 INJECTION, EMULSION INTRAVENOUS at 04:31

## 2024-12-17 RX ADMIN — CEFTRIAXONE SODIUM 2 G: 2 INJECTION, SOLUTION INTRAVENOUS at 05:26

## 2024-12-17 RX ADMIN — PROPOFOL: 10 INJECTION, EMULSION INTRAVENOUS at 05:37

## 2024-12-17 RX ADMIN — ETOMIDATE 20 MG: 2 INJECTION, SOLUTION INTRAVENOUS at 03:05

## 2024-12-17 RX ADMIN — LEVETIRACETAM 4000 MG: 100 INJECTION, SOLUTION INTRAVENOUS at 05:40

## 2024-12-17 RX ADMIN — PROPOFOL 70 MCG/KG/MIN: 10 INJECTION, EMULSION INTRAVENOUS at 07:27

## 2024-12-17 RX ADMIN — PROPOFOL 25 MCG/KG/MIN: 10 INJECTION, EMULSION INTRAVENOUS at 03:14

## 2024-12-17 RX ADMIN — DROPERIDOL 10 MG: 2.5 INJECTION, SOLUTION INTRAMUSCULAR; INTRAVENOUS at 02:48

## 2024-12-17 RX ADMIN — Medication 125 MCG/HR: at 06:04

## 2024-12-17 RX ADMIN — DIAZEPAM 10 MG: 10 INJECTION, SOLUTION INTRAMUSCULAR; INTRAVENOUS at 05:06

## 2024-12-17 RX ADMIN — SODIUM CHLORIDE 1000 ML: 9 INJECTION, SOLUTION INTRAVENOUS at 04:00

## 2024-12-17 RX ADMIN — Medication 50 MCG/HR: at 04:17

## 2024-12-17 RX ADMIN — Medication 150 MCG/HR: at 06:18

## 2024-12-17 RX ADMIN — IOPAMIDOL 76 ML: 755 INJECTION, SOLUTION INTRAVENOUS at 03:30

## 2024-12-17 RX ADMIN — ROCURONIUM BROMIDE 100 MG: 10 INJECTION, SOLUTION INTRAVENOUS at 03:05

## 2024-12-17 ASSESSMENT — ACTIVITIES OF DAILY LIVING (ADL)
ADLS_ACUITY_SCORE: 41
ADLS_ACUITY_SCORE: 41
ADLS_ACUITY_SCORE: 43
ADLS_ACUITY_SCORE: 41

## 2024-12-17 NOTE — PROGRESS NOTES
Responded to trauma alert ER room 10. Placed pt on NRB for hyperoxygenation then urgently intubated by Dr Prado with 8mm ETT and secured at 24 at the lips. CO2 detector yellow. CO2 monitor low 40s. SpO2 100%. Pt brought to CT with breathing assisted with ambu at 6L and peep 5 with peep valve. SpO2 100%. Back to room 10 and placed on vent with settings AC14 Vt 400ml PEEP5 FiO2 35%. SpO2 mid 90s. Oral suction for copious amounts thick clear, Suction ETT for moderate amount thick whitish clear. RT standing by

## 2024-12-17 NOTE — ED NOTES
Pt with 3 IV's. Urethral jolly catheter placed. OG placed, 65 cm at the lip, confirmation with x-ray. Pt on IV propofol gtt and IV fentanyl gtt. Pt received IV Rocephin and IV Keppra.  1 Liter NS saline bolus infusing. MD Prado placed sutures to right eye brow. LP done at bedside.

## 2024-12-17 NOTE — ED NOTES
Face to face report given with opportunity to observe patient.    Report given to PHILIP Christiansen.    Radha Cobian RN   12/17/2024  7:21 AM

## 2024-12-17 NOTE — ED NOTES
Transferred here by [facility], accompanied with HPD. MD Prado at bedside completing trauma eval.

## 2024-12-17 NOTE — ED NOTES
Pt Impulsive, unable to follow commands, reported fall with laceration to head. IM droperidal given. Pt with 2 minute witnessed seizure here. RSI medications given and then intubation at this time. Seizure pads applied.

## 2024-12-17 NOTE — ED PROVIDER NOTES
History     Chief Complaint   Patient presents with    Fall    Seizures     HPI  Jarett Duenas is a 49 year old male who is here with reported seizures agitation and change in mental status.  Unable to obtain history due to patient being altered.  Arrived with [place] [employer].    Allergies:  No Known Allergies    Problem List:    Patient Active Problem List    Diagnosis Date Noted    CARDIOVASCULAR SCREENING; LDL GOAL LESS THAN 160 10/31/2010     Priority: Medium    Sprain of thoracic region 01/02/2006     Priority: Medium        Past Medical History:    Past Medical History:   Diagnosis Date    NO ACTIVE PROBLEMS        Past Surgical History:    No past surgical history on file.    Family History:    No family history on file.    Social History:  Marital Status:  Single [1]  Social History     Tobacco Use    Smoking status: Every Day     Current packs/day: 0.50     Average packs/day: 0.5 packs/day for 17.0 years (8.5 ttl pk-yrs)     Types: Cigarettes    Smokeless tobacco: Never   Substance Use Topics    Alcohol use: Yes     Comment: last drink; 0400    Drug use: Yes     Types: Marijuana     Comment: occasional        Medications:    cyclobenzaprine (FLEXERIL) 5 MG tablet  IBUPROFEN PO  levETIRAcetam (KEPPRA) 500 MG tablet  predniSONE (DELTASONE) 20 MG tablet          Review of Systems   Unable to perform ROS: Acuity of condition   All other systems reviewed and are negative.      Physical Exam   BP: (!) 201/117  Pulse: 115  Temp: (!) 100.9  F (38.3  C)  Resp: 15  Weight: 67.8 kg (149 lb 7.6 oz)  SpO2: 100 %      Physical Exam  Vitals and nursing note reviewed.   Constitutional:       General: He is in acute distress.   HENT:      Head: Normocephalic.      Comments: 1cm lac to right eye brow     Right Ear: External ear normal.      Left Ear: External ear normal.   Cardiovascular:      Rate and Rhythm: Regular rhythm. Tachycardia present.   Pulmonary:      Effort: Pulmonary effort is normal.      Breath sounds:  Normal breath sounds.   Neurological:      Comments: Not responding to commands appropriately, agitated, getting out of bed and pacing around, not redirectable, later on had generalized tonic clonic seizure         ED Course     ED Course as of 12/17/24 0550   Tue Dec 17, 2024   0248 Altered + head injury + intox = trauma activation   0248 IM droperidol to expedite workup   0519 Son = Eric Duenas    [phone]     [facility]    -Lumbar Puncture    Date/Time: 12/17/2024 5:35 AM    Performed by: Sagar Prado MD  Authorized by: Sagar Prado MD    Risks, benefits and alternatives discussed.      PRE-PROCEDURE DETAILS:     Procedure purpose:  Diagnostic    ANESTHESIA (see MAR for exact dosages):     Anesthesia method:  None  PROCEDURE DETAILS:     Lumbar space:  L4-L5 interspace    Patient position:  R lateral decubitus    Needle gauge:  18    Needle type:  Spinal needle - Quincke tip    Needle length (in):  3.5    Ultrasound guidance: no      Number of attempts:  1    Opening pressure (cm H2O):  12    Fluid appearance:  Clear    Tubes of fluid:  4    Total volume (ml):  8    POST-PROCEDURE:     Puncture site:  Adhesive bandage applied      PROCEDURE    Patient Tolerance:  Patient tolerated the procedure well with no immediate complications  -Intubation    Date/Time: 12/17/2024 5:42 AM    Performed by: Sagar Prado MD  Authorized by: Sagar Prado MD    Emergent condition/consent implied    ED EVALUATION:      I have performed an Emergency Department Evaluation including taking a history and physical examination, this evaluation will be documented in the electronic medical record for this ED encounter.     Indication: trauma patient head injury    ASA Class: Class 3- Severe systemic disease, definite functional limitations    : unable to perform d/t agitation.    NPO Status: not NPO, emergent situation    UNIVERSAL PROTOCOL   Site Marked: NA  Prior Images Obtained and Reviewed:  NA  Required  items: Required blood products, implants, devices and special equipment available    Patient identity confirmed:  Hospital-assigned identification number  Patient was reevaluated immediately before administering moderate or deep sedation or anesthesia  Confirmation Checklist:  Procedure was appropriate and matched the consent or emergent situation  Time out: Immediately prior to the procedure a time out was called    Universal Protocol: the Joint Commission Universal Protocol was followed      SEDATION  Patient Sedated: Yes    Sedation Type:  Deep  Sedation:  Propofol and fentanyl  Vital signs: Vital signs monitored during sedation    Universal protocol:     Required blood products, implants, devices, and special equipment available: yes      Immediately prior to procedure, a time out was called: yes      Patient identity confirmed:  Hospital-assigned identification number  Pre-procedure details:     Indications: airway protection and altered consciousness      Patient status:  Altered mental status    Look externally: facial trauma      Mouth opening - incisor distance:  2 finger widths    Hyoid-mental distance: 2 finger widths      Hyoid-thyroid distance: 2 or more finger widths      Obstruction: none      Induction agents:  Etomidate    Paralytics:  Rocuronium  Procedure details:     Preoxygenation:  Nonrebreather mask    CPR in progress: no      Number of attempts:  1  Successful intubation attempt details:     Intubation method:  Oral    Intubation technique: direct      Laryngoscope blade:  Mac 4    Bougie used: yes      Grade view: II      Tube size (mm):  8.0    Tube type:  Cuffed    Tube visualized through cords: yes    Placement assessment:     ETT at teeth/gumline (cm):  23    Tube secured with:  Adhesive tape and ETT olson    Breath sounds:  Equal    Placement verification: colorimetric ETCO2 and direct visualization      CXR findings:  Appropriate position  Post-procedure details:     Procedure  completion:  Patient tolerated the procedure well with no immediate complications    PROCEDURE    Patient Tolerance:  Patient tolerated the procedure well with no immediate complications  [facility]    -Laceration Repair    Date/Time: 12/17/2024 5:51 AM    Performed by: Sagar Prado MD  Authorized by: Sagar Prado MD    Emergent condition/consent implied      ANESTHESIA (see MAR for exact dosages):     Anesthesia method:  None  LACERATION DETAILS     Location:  Face    Face location:  R eyebrow    Length (cm):  1    REPAIR TYPE:     Repair type:  Simple    EXPLORATION:     Hemostasis achieved with:  Direct pressure    Wound exploration: entire depth of wound probed and visualized      Contaminated: yes      TREATMENT:     Area cleansed with:  Saline    Amount of cleaning:  Standard    Irrigation solution:  Sterile water    Irrigation volume:  250    Irrigation method:  Pressure wash    Visualized foreign bodies/material removed: yes      SKIN REPAIR     Repair method:  Sutures    Suture size:  6-0    Suture material:  Prolene    Suture technique:  Simple interrupted    Number of sutures:  2    APPROXIMATION     Approximation:  Close    POST-PROCEDURE DETAILS     Dressing:  Open (no dressing)      PROCEDURE    Patient Tolerance:  Patient tolerated the procedure well with no immediate complications                EKG Interpretation:      Interpreted by Sagar Prado MD  Time reviewed:   Symptoms at time of EKG: ams   Rhythm: normal sinus   Rate: Normal  Axis: Normal  Ectopy: none  Conduction: nonspecific interventricular conduction block  ST Segments/ T Waves: No acute ischemic changes  Q Waves: none  Comparison to prior: No old EKG available    Clinical Impression: normal EKG            Critical Care time:  was 69 minutes for this patient excluding procedures.             Results for orders placed or performed during the hospital encounter of 12/17/24 (from the past 24 hours)   CBC with platelets  differential    Narrative    The following orders were created for panel order CBC with platelets differential.  Procedure                               Abnormality         Status                     ---------                               -----------         ------                     CBC with platelets and d...[[id number]]  Abnormal            Final result               RBC and Platelet Morphology[[id number]]                      Final result                 Please view results for these tests on the individual orders.   Comprehensive metabolic panel   Result Value Ref Range    Sodium 135 135 - 145 mmol/L    Potassium 4.1 3.4 - 5.3 mmol/L    Carbon Dioxide (CO2) 19 (L) 22 - 29 mmol/L    Anion Gap 22 (H) 7 - 15 mmol/L    Urea Nitrogen 24.6 (H) 6.0 - 20.0 mg/dL    Creatinine 1.19 (H) 0.67 - 1.17 mg/dL    GFR Estimate 74 >60 mL/min/1.73m2    Calcium 9.0 8.8 - 10.4 mg/dL    Chloride 94 (L) 98 - 107 mmol/L    Glucose 140 (H) 70 - 99 mg/dL    Alkaline Phosphatase 64 40 - 150 U/L     (H) 0 - 45 U/L    ALT 48 0 - 70 U/L    Protein Total 6.5 6.4 - 8.3 g/dL    Albumin 4.5 3.5 - 5.2 g/dL    Bilirubin Total 0.7 <=1.2 mg/dL   INR   Result Value Ref Range    INR 1.08 0.85 - 1.15   Partial thromboplastin time   Result Value Ref Range    aPTT 24 22 - 38 Seconds   Alcohol ethyl   Result Value Ref Range    Alcohol ethyl 0.01 <=0.01 g/dL   Blood gas venous   Result Value Ref Range    pH Venous 7.30 (L) 7.32 - 7.43    pCO2 Venous 37 (L) 40 - 50 mm Hg    pO2 Venous 121 (H) 25 - 47 mm Hg    Bicarbonate Venous 18 (L) 21 - 28 mmol/L    Base Excess/Deficit Venous -7.3 (L) -3.0 - 3.0 mmol/L    FIO2 100     Oxyhemoglobin Venous 97 (H) 70 - 75 %    O2 Sat, Venous 98.2 (H) 70.0 - 75.0 %    Narrative    In healthy individuals, oxyhemoglobin (O2Hb) and oxygen saturation (SO2) are approximately equal. In the presence of dyshemoglobins, oxyhemoglobin can be considerably lower than oxygen saturation.   ABO/Rh type and screen    Narrative     The following orders were created for panel order ABO/Rh type and screen.  Procedure                               Abnormality         Status                     ---------                               -----------         ------                     Adult Type and Screen[[id number]]                            Edited Result - FINAL        Please view results for these tests on the individual orders.   CK Total   Result Value Ref Range    CK 2,400 (HH) 39 - 308 U/L   CBC with platelets and differential   Result Value Ref Range    WBC Count 22.2 (H) 4.0 - 11.0 10e3/uL    RBC Count 4.30 (L) 4.40 - 5.90 10e6/uL    Hemoglobin 13.9 13.3 - 17.7 g/dL    Hematocrit 40.8 40.0 - 53.0 %    MCV 95 78 - 100 fL    MCH 32.3 26.5 - 33.0 pg    MCHC 34.1 31.5 - 36.5 g/dL    RDW 13.5 10.0 - 15.0 %    Platelet Count 231 150 - 450 10e3/uL    % Neutrophils 87 %    % Lymphocytes 3 %    % Monocytes 9 %    % Eosinophils 1 %    % Basophils 0 %    % Immature Granulocytes 1 %    NRBCs per 100 WBC 0 <1 /100    Absolute Neutrophils 19.4 (H) 1.6 - 8.3 10e3/uL    Absolute Lymphocytes 0.6 (L) 0.8 - 5.3 10e3/uL    Absolute Monocytes 1.9 (H) 0.0 - 1.3 10e3/uL    Absolute Eosinophils 0.2 0.0 - 0.7 10e3/uL    Absolute Basophils 0.1 0.0 - 0.2 10e3/uL    Absolute Immature Granulocytes 0.1 <=0.4 10e3/uL    Absolute NRBCs 0.0 10e3/uL   Adult Type and Screen   Result Value Ref Range    ABO/RH(D) A POS     Antibody Screen Negative Negative    SPECIMEN EXPIRATION DATE [id number]    RBC and Platelet Morphology   Result Value Ref Range    RBC Morphology Confirmed RBC Indices     Platelet Assessment  Automated Count Confirmed. Platelet morphology is normal.     Automated Count Confirmed. Platelet morphology is normal.   CT Head w/o Contrast    Narrative    EXAM: CT HEAD W/O CONTRAST, CT CERVICAL SPINE W/O CONTRAST  LOCATION: [facility]  DATE: 12/17/2024    INDICATION: Trauma  COMPARISON: Head CT dated 10/31/2016  TECHNIQUE:   1) Routine CT Head  without IV contrast. Multiplanar reformats. Dose reduction techniques were used.  2) Routine CT Cervical Spine without IV contrast. Multiplanar reformats. Dose reduction techniques were used.    FINDINGS:   HEAD CT:   INTRACRANIAL CONTENTS: No intracranial hemorrhage, extraaxial collection, or mass effect.  No CT evidence of acute infarct. Normal parenchymal attenuation. Normal ventricles and sulci. Mild intracranial atherosclerotic disease.    VISUALIZED ORBITS/SINUSES/MASTOIDS: No intraorbital abnormality. Marked mucosal thickening and near complete opacification of the left maxillary sinus. Mild mucosal thickening right maxillary, bilateral ethmoid, and left frontal sinuses. Bilateral   sphenoid and right frontal sinuses appear clear. No middle ear or mastoid effusion.    BONES/SOFT TISSUES: No acute abnormality.    CERVICAL SPINE CT:   VERTEBRA: Right lateral bending of the neck and reversal of the cervical lordosis is likely positional. Otherwise normal vertebral alignment. Normal vertebral body heights. No fracture or posttraumatic subluxation. Moderate to marked degenerative disc   disease from C5 to C7 and mild degenerative disc disease at C4-C5.    CANAL/FORAMINA: At least mild central canal stenosis at C5-C6 and C6-C7 due to posterior disc/osteophyte complexes. Moderate to marked left and mild to moderate right C5-C6 and moderate to marked left and mild right C6-C7 foraminal narrowing due to facet   hypertrophic changes and uncovertebral osteophytes.    PARASPINAL: Endotracheal tube with tip between thoracic inlet and elver. Very mild periseptal emphysema.      Impression    IMPRESSION:  HEAD CT:  1.  No acute intracranial process.  2.  Paranasal sinus inflammatory changes.    CERVICAL SPINE CT:  1.  No CT evidence for acute fracture or post traumatic subluxation.  2.  Degenerative changes with central canal and foraminal stenosis as described above.   CT Cervical Spine w/o Contrast    Narrative    EXAM:  CT HEAD W/O CONTRAST, CT CERVICAL SPINE W/O CONTRAST  LOCATION: [facility]  DATE: 12/17/2024    INDICATION: Trauma  COMPARISON: Head CT dated 10/31/2016  TECHNIQUE:   1) Routine CT Head without IV contrast. Multiplanar reformats. Dose reduction techniques were used.  2) Routine CT Cervical Spine without IV contrast. Multiplanar reformats. Dose reduction techniques were used.    FINDINGS:   HEAD CT:   INTRACRANIAL CONTENTS: No intracranial hemorrhage, extraaxial collection, or mass effect.  No CT evidence of acute infarct. Normal parenchymal attenuation. Normal ventricles and sulci. Mild intracranial atherosclerotic disease.    VISUALIZED ORBITS/SINUSES/MASTOIDS: No intraorbital abnormality. Marked mucosal thickening and near complete opacification of the left maxillary sinus. Mild mucosal thickening right maxillary, bilateral ethmoid, and left frontal sinuses. Bilateral   sphenoid and right frontal sinuses appear clear. No middle ear or mastoid effusion.    BONES/SOFT TISSUES: No acute abnormality.    CERVICAL SPINE CT:   VERTEBRA: Right lateral bending of the neck and reversal of the cervical lordosis is likely positional. Otherwise normal vertebral alignment. Normal vertebral body heights. No fracture or posttraumatic subluxation. Moderate to marked degenerative disc   disease from C5 to C7 and mild degenerative disc disease at C4-C5.    CANAL/FORAMINA: At least mild central canal stenosis at C5-C6 and C6-C7 due to posterior disc/osteophyte complexes. Moderate to marked left and mild to moderate right C5-C6 and moderate to marked left and mild right C6-C7 foraminal narrowing due to facet   hypertrophic changes and uncovertebral osteophytes.    PARASPINAL: Endotracheal tube with tip between thoracic inlet and elver. Very mild periseptal emphysema.      Impression    IMPRESSION:  HEAD CT:  1.  No acute intracranial process.  2.  Paranasal sinus inflammatory changes.    CERVICAL SPINE CT:  1.  No CT  evidence for acute fracture or post traumatic subluxation.  2.  Degenerative changes with central canal and foraminal stenosis as described above.   CT Chest/Abdomen/Pelvis w Contrast    Narrative    EXAM: CT CHEST/ABDOMEN/PELVIS W CONTRAST  LOCATION: [facility]  DATE: 12/17/2024    INDICATION: Trauma  COMPARISON: Chest radiographs from 8/24/2020.  TECHNIQUE: CT scan of the chest, abdomen, and pelvis was performed following injection of IV contrast. Multiplanar reformats were obtained. Dose reduction techniques were used.   CONTRAST: Isovue 370 76ml    FINDINGS:   LUNGS AND PLEURA: Endotracheal tube terminates above the elver. Mild paraseptal emphysema in the upper lung zones. Dependent atelectasis in both lower lobes. No pneumothorax or pleural effusion.    MEDIASTINUM/AXILLAE: Normal heart size. No pericardial effusion. No mediastinal hematoma.    CORONARY ARTERY CALCIFICATION: Mild to moderate    HEPATOBILIARY: Mild hepatic steatosis. No focal liver injury. Normal gallbladder.    PANCREAS: Normal.    SPLEEN: Normal.    ADRENAL GLANDS: Normal.    KIDNEYS/BLADDER: Normal.    BOWEL: No free air.    LYMPH NODES: Normal.    VASCULATURE: Moderate atherosclerotic change of the infrarenal abdominal aorta without aneurysm.    PELVIC ORGANS: Normal.    MUSCULOSKELETAL: Mild thoracic and lumbar spine degenerative disc changes. Callus formation at the left 10th costovertebral junction suggestive of prior fracture.      Impression    IMPRESSION:  1.  No acute, traumatic findings in the chest, abdomen, or pelvis.    2.  Mild to moderate coronary artery disease.    3.  Mild hepatic steatosis.   Urine Drug Screen    Narrative    The following orders were created for panel order Urine Drug Screen.  Procedure                               Abnormality         Status                     ---------                               -----------         ------                     Urine Drug Screen Panel[[id number]]      Abnormal             Final result                 Please view results for these tests on the individual orders.   Urine Drug Screen Panel   Result Value Ref Range    Amphetamines Urine Screen Negative Screen Negative    Barbituates Urine Screen Negative Screen Negative    Benzodiazepine Urine Screen Negative Screen Negative    Cannabinoids Urine Screen Positive (A) Screen Negative    Cocaine Urine Screen Negative Screen Negative    Fentanyl Qual Urine Screen Negative Screen Negative    Opiates Urine Screen Negative Screen Negative    PCP Urine Screen Negative Screen Negative   UA with Microscopic reflex to Culture    Specimen: Urine, Catheter   Result Value Ref Range    Color Urine Light Yellow Colorless, Straw, Light Yellow, Yellow    Appearance Urine Cloudy (A) Clear    Glucose Urine Negative Negative mg/dL    Bilirubin Urine Negative Negative    Ketones Urine 10 (A) Negative mg/dL    Specific Gravity Urine 1.000 (L) 1.003 - 1.035    Blood Urine Large (A) Negative    pH Urine 6.0 4.7 - 8.0    Protein Albumin Urine 30 (A) Negative mg/dL    Urobilinogen Urine Normal Normal, 2.0 mg/dL    Nitrite Urine Negative Negative    Leukocyte Esterase Urine Negative Negative    Bacteria Urine Few (A) None Seen /HPF    Mucus Urine Present (A) None Seen /LPF    Calcium Oxalate Crystals Urine Many (A) None Seen /HPF    RBC Urine 65 (H) <=2 /HPF    WBC Urine 0 <=5 /HPF    Squamous Epithelials Urine 0 <=1 /HPF    Hyaline Casts Urine 5 (H) <=2 /LPF    Narrative    Urine Culture not indicated   CSF Cell Count with Differential:    Narrative    The following orders were created for panel order CSF Cell Count with Differential:.  Procedure                               Abnormality         Status                     ---------                               -----------         ------                     Cell Count CSF[[id number]]                                   In process                   Please view results for these tests on the individual  orders.   Glucose CSF:   Result Value Ref Range    Glucose CSF 90 (H) 40 - 70 mg/dL    Narrative    CSF glucose concentrations are about 60 percent of normal plasma glucose.   Protein total CSF:   Result Value Ref Range    Protein total CSF 38.1 15.0 - 45.0 mg/dL   EKG 12-lead, tracing only   Result Value Ref Range    Systolic Blood Pressure  mmHg    Diastolic Blood Pressure  mmHg    Ventricular Rate 69 BPM    Atrial Rate 69 BPM    GA Interval 140 ms    QRS Duration 94 ms     ms    QTc 411 ms    P Axis 55 degrees    R AXIS 92 degrees    T Axis 81 degrees    Interpretation ECG       Sinus rhythm  Rightward axis  Incomplete right bundle branch block  Borderline ECG  No previous ECGs available         Medications   lidocaine 1 % 0.1-1 mL (has no administration in time range)   lidocaine (LMX4) cream (has no administration in time range)   sodium chloride (PF) 0.9% PF flush 3 mL ( Intracatheter Canceled Entry 12/17/24 0541)   sodium chloride (PF) 0.9% PF flush 3 mL (has no administration in time range)   fentaNYL (SUBLIMAZE) infusion (100 mcg/hr Intravenous Rate/Dose Change 12/17/24 0550)   levETIRAcetam (KEPPRA) 4,000 mg in sodium chloride 0.9 % 150 mL intermittent infusion (4,000 mg Intravenous $New Bag 12/17/24 0540)   levETIRAcetam (KEPPRA) 500 MG/5ML injection (  Canceled Entry 12/17/24 0540)   cefTRIAXone IN D5W (ROCEPHIN) intermittent infusion 2 g (2 g Intravenous $New Bag 12/17/24 0526)   droPERidol (INAPSINE) injection 10 mg (10 mg Intramuscular $Given 12/17/24 0248)   iopamidol (ISOVUE-370) solution 76 mL (76 mLs Intravenous $Given 12/17/24 0330)   sodium chloride (PF) 0.9% PF flush 50 mL (50 mLs Intravenous $Given 12/17/24 0330)   LORazepam (ATIVAN) injection 2 mg (2 mg Intravenous Not Given 12/17/24 0321)   propofol (DIPRIVAN) 1000 MG/100ML infusion (25 mcg/kg/min  $New Bag 12/17/24 0314)   sodium chloride 0.9% BOLUS 1,000 mL (1,000 mLs Intravenous $New Bag 12/17/24 0400)   diazepam (VALIUM) injection  10 mg (10 mg Intravenous $Given 12/17/24 0506)   propofol (DIPRIVAN) 1000 MG/100ML infusion (  $New Bag 12/17/24 8416)   propofol (DIPRIVAN) 1000 MG/100ML infusion (  $New Bag 12/17/24 8637)       Assessments & Plan (with Medical Decision Making)     I have reviewed the nursing notes.    I have reviewed the findings, diagnosis, plan and need for follow up with the patient.  Critical Care Addendum  My initial assessment, based on my review of nursing observations, review of vital signs, focused history, physical exam, and review of cardiac rhythm monitor, established a high suspicion that Jarett Duenas has altered mental status, which requires immediate intervention, and therefore he is critically ill.     After the initial assessment, the care team initiated multiple lab tests, initiated IV fluid administration, initiated medication therapy with ceftriaxone droperidol valium propofol keppra, performed advanced airway management, and performed lumbar puncture  to provide stabilization care. Due to the critical nature of this patient, I reassessed nursing observations, vital signs, physical exam, review of cardiac rhythm monitor, mental status, neurologic status, and respiratory status multiple times prior to his disposition.     Time also spent performing documentation, discussion with family to obtain medical information for decision making, reviewing test results, discussion with consultants, and coordination of care.     Critical care time (excluding teaching time and procedures): 69 minutes.           Medical Decision Making  The patient's presentation was of high complexity (an acute health issue posing potential threat to life or bodily function).    The patient's evaluation involved:  an assessment requiring an independent historian (son, Eric Duenas, [phone])  ordering and/or review of 3+ test(s) in this encounter (see separate area of note for details)  discussion of management or test  interpretation with another health professional (intensivist at [facility] dr morales)    The patient's management necessitated high risk (a parenteral controlled substance), high risk (drug therapy requiring intensive monitoring (droperidol)), high risk (a decision regarding emergency major procedure (intubation)), high risk (a decision regarding hospitalization), and high risk (restraints and/or parenteral medication for agitation).    49-year-old male here with altered mental status, head trauma, multiple seizures reported.  Patient does not have useful information in her EMR.  I talked to his son Eric, [phone], tells me his father has a seizure disorder, has been prescribed medication, unsure if he takes it.  Son states that patient does not like to disclose any his medical information and becomes agitated when asked about it.    In the context of head trauma, seizures, and altered mental status, I elected to intubate the patient to protect his airway and expedite the workup.  CT Noncon head, CT cervical spine, and CT chest and pelvis did not show any trauma.  I then gave him Keppra to cover for seizures.  We then determined that he was febrile, 38.3 with temperature sensing Decker, so I perform lumbar puncture, awaiting results.  However CSF fluid was clear so I have a low suspicion for bacterial meningitis.  CK was 2400 he was given 1 L of fluid.  Patient is requiring max dose propofol as well as fentanyl for adequate sedation.    Finally, laceration on face was repaired.  Tetanus up-to-date.    New Prescriptions    No medications on file       Final diagnoses:   Status epilepticus (H)   Facial laceration, initial encounter       12/17/2024   [facility]       Sagar Prado MD  12/17/24 0551       Sagar Prado MD  12/17/24 0552

## 2024-12-17 NOTE — ED NOTES
Bed: ED10a  Expected date: 12/16/24  Expected time: 7:59 PM  Means of arrival:   Comments:  CRITICAL

## 2024-12-19 LAB
BACTERIA BLD CULT: NORMAL
BACTERIA BLD CULT: NORMAL
BACTERIA CSF CULT: NORMAL
GRAM STAIN RESULT: NORMAL
GRAM STAIN RESULT: NORMAL

## 2024-12-22 LAB
BACTERIA BLD CULT: NO GROWTH
BACTERIA BLD CULT: NO GROWTH

## 2024-12-25 LAB
BACTERIA CSF CULT: NO GROWTH
GRAM STAIN RESULT: NORMAL
GRAM STAIN RESULT: NORMAL

## 2025-01-27 NOTE — ED AVS SNAPSHOT
[facility]  [address] [address]  Phone:  [phone]                                    Jarett Duenas   MRN: [id number]    Department:  [facility]   Date of Visit:  5/25/2019           After Visit Summary Signature Page    I have received my discharge instructions, and my questions have been answered. I have discussed any challenges I see with this plan with the nurse or doctor.    ..........................................................................................................................................  Patient/Patient Representative Signature      ..........................................................................................................................................  Patient Representative Print Name and Relationship to Patient    ..................................................               ................................................  Date                                   Time    ..........................................................................................................................................  Reviewed by Signature/Title    ...................................................              ..............................................  Date                                               Time          22EPIC Rev 08/18        62 y/o female who presents with 2 year history of worsening left knee pain , swelling , buckling . pain is constant anfd worse paon when sitting to standing position , walking , pain scale 8/10 . Tried gel injection s, cortisone injections in the past with norelief . steph 62 y/o female who presents with 2 year history of worsening left knee pain , swelling , buckling . pain is constant and worse pain when sitting to standing position , walking , pain scale 8/10 . Tried gel injection s, cortisone injections in the past with no relief . scheduled for left knee replacement on 2/11/25 60 y/o female who presents with 2 year history of worsening left knee pain , swelling , buckling . pain is constant and worse pain when sitting to standing position , walking , pain scale 10/10 . Tried gel injection s, cortisone injections in the past with no relief . scheduled for left knee replacement on 2/11/25

## 2025-02-04 NOTE — ED TRIAGE NOTES
Arrived by [facility]. Reported to have seizures since 5 pm every 1 hour since 12/16/24. Pt had been drinking all weekend. Last drink unknown. Confused and impulsive.     Triage Assessment (Adult)       Row Name 12/17/24 0236          Triage Assessment    Airway WDL WDL                      Pt reports pain, redness and swelling to lt great toe for about one week. Pt states she thinks it may be an ingrown nail.

## 2025-06-02 ENCOUNTER — HOSPITAL ENCOUNTER (EMERGENCY)
Facility: HOSPITAL | Age: 50
Discharge: HOME | End: 2025-06-02
Attending: PHYSICIAN ASSISTANT
Payer: COMMERCIAL

## 2025-06-02 VITALS
TEMPERATURE: 98.2 F | BODY MASS INDEX: 21.36 KG/M2 | HEIGHT: 71 IN | SYSTOLIC BLOOD PRESSURE: 150 MMHG | OXYGEN SATURATION: 97 % | DIASTOLIC BLOOD PRESSURE: 110 MMHG | WEIGHT: 152.6 LBS | HEART RATE: 71 BPM | RESPIRATION RATE: 11 BRPM

## 2025-06-02 DIAGNOSIS — R56.9 SEIZURE (H): ICD-10-CM

## 2025-06-02 DIAGNOSIS — G40.909 SEIZURE DISORDER (H): ICD-10-CM

## 2025-06-02 DIAGNOSIS — F10.10 ALCOHOL ABUSE: ICD-10-CM

## 2025-06-02 LAB
ALBUMIN SERPL BCG-MCNC: 4.2 G/DL (ref 3.5–5.2)
ALP SERPL-CCNC: 69 U/L (ref 40–150)
ALT SERPL W P-5'-P-CCNC: 30 U/L (ref 0–70)
ANION GAP SERPL CALCULATED.3IONS-SCNC: 12 MMOL/L (ref 7–15)
AST SERPL W P-5'-P-CCNC: 36 U/L (ref 0–45)
BASOPHILS # BLD AUTO: 0.1 10E3/UL (ref 0–0.2)
BASOPHILS NFR BLD AUTO: 1 %
BILIRUB SERPL-MCNC: 0.5 MG/DL
BUN SERPL-MCNC: 13.8 MG/DL (ref 6–20)
CALCIUM SERPL-MCNC: 9 MG/DL (ref 8.8–10.4)
CHLORIDE SERPL-SCNC: 97 MMOL/L (ref 98–107)
CREAT SERPL-MCNC: 0.71 MG/DL (ref 0.67–1.17)
EGFRCR SERPLBLD CKD-EPI 2021: >90 ML/MIN/1.73M2
EOSINOPHIL # BLD AUTO: 0 10E3/UL (ref 0–0.7)
EOSINOPHIL NFR BLD AUTO: 0 %
ERYTHROCYTE [DISTWIDTH] IN BLOOD BY AUTOMATED COUNT: 15.3 % (ref 10–15)
GLUCOSE SERPL-MCNC: 171 MG/DL (ref 70–99)
HCO3 SERPL-SCNC: 25 MMOL/L (ref 22–29)
HCT VFR BLD AUTO: 39.6 % (ref 40–53)
HGB BLD-MCNC: 13.7 G/DL (ref 13.3–17.7)
HOLD SPECIMEN: NORMAL
HOLD SPECIMEN: NORMAL
IMM GRANULOCYTES # BLD: 0.1 10E3/UL
IMM GRANULOCYTES NFR BLD: 1 %
LACTATE SERPL-SCNC: 1.5 MMOL/L (ref 0.7–2)
LYMPHOCYTES # BLD AUTO: 0.7 10E3/UL (ref 0.8–5.3)
LYMPHOCYTES NFR BLD AUTO: 7 %
MAGNESIUM SERPL-MCNC: 1.7 MG/DL (ref 1.7–2.3)
MCH RBC QN AUTO: 32 PG (ref 26.5–33)
MCHC RBC AUTO-ENTMCNC: 34.6 G/DL (ref 31.5–36.5)
MCV RBC AUTO: 93 FL (ref 78–100)
MONOCYTES # BLD AUTO: 0.5 10E3/UL (ref 0–1.3)
MONOCYTES NFR BLD AUTO: 6 %
NEUTROPHILS # BLD AUTO: 7.8 10E3/UL (ref 1.6–8.3)
NEUTROPHILS NFR BLD AUTO: 86 %
NRBC # BLD AUTO: 0 10E3/UL
NRBC BLD AUTO-RTO: 0 /100
PLATELET # BLD AUTO: 283 10E3/UL (ref 150–450)
POTASSIUM SERPL-SCNC: 4 MMOL/L (ref 3.4–5.3)
PROT SERPL-MCNC: 6.8 G/DL (ref 6.4–8.3)
RBC # BLD AUTO: 4.28 10E6/UL (ref 4.4–5.9)
SODIUM SERPL-SCNC: 134 MMOL/L (ref 135–145)
WBC # BLD AUTO: 9.1 10E3/UL (ref 4–11)

## 2025-06-02 PROCEDURE — 36415 COLL VENOUS BLD VENIPUNCTURE: CPT | Performed by: PHYSICIAN ASSISTANT

## 2025-06-02 PROCEDURE — 85041 AUTOMATED RBC COUNT: CPT | Performed by: PHYSICIAN ASSISTANT

## 2025-06-02 PROCEDURE — 83735 ASSAY OF MAGNESIUM: CPT | Performed by: PHYSICIAN ASSISTANT

## 2025-06-02 PROCEDURE — 85004 AUTOMATED DIFF WBC COUNT: CPT | Performed by: PHYSICIAN ASSISTANT

## 2025-06-02 PROCEDURE — 99284 EMERGENCY DEPT VISIT MOD MDM: CPT | Performed by: PHYSICIAN ASSISTANT

## 2025-06-02 PROCEDURE — 99284 EMERGENCY DEPT VISIT MOD MDM: CPT | Mod: 25

## 2025-06-02 PROCEDURE — 83605 ASSAY OF LACTIC ACID: CPT | Performed by: PHYSICIAN ASSISTANT

## 2025-06-02 PROCEDURE — 84155 ASSAY OF PROTEIN SERUM: CPT | Performed by: PHYSICIAN ASSISTANT

## 2025-06-02 PROCEDURE — 96365 THER/PROPH/DIAG IV INF INIT: CPT

## 2025-06-02 PROCEDURE — 250N000013 HC RX MED GY IP 250 OP 250 PS 637: Performed by: PHYSICIAN ASSISTANT

## 2025-06-02 PROCEDURE — 250N000011 HC RX IP 250 OP 636: Performed by: PHYSICIAN ASSISTANT

## 2025-06-02 PROCEDURE — 96375 TX/PRO/DX INJ NEW DRUG ADDON: CPT

## 2025-06-02 RX ORDER — DIAZEPAM 10 MG/1
TABLET ORAL
Qty: 10 TABLET | Refills: 0 | Status: SHIPPED | OUTPATIENT
Start: 2025-06-02

## 2025-06-02 RX ORDER — LEVETIRACETAM 10 MG/ML
1000 INJECTION INTRAVASCULAR ONCE
Status: COMPLETED | OUTPATIENT
Start: 2025-06-02 | End: 2025-06-02

## 2025-06-02 RX ORDER — DIAZEPAM 10 MG/2ML
10 INJECTION, SOLUTION INTRAMUSCULAR; INTRAVENOUS ONCE
Status: COMPLETED | OUTPATIENT
Start: 2025-06-02 | End: 2025-06-02

## 2025-06-02 RX ORDER — MULTIPLE VITAMINS W/ MINERALS TAB 9MG-400MCG
1 TAB ORAL ONCE
Status: COMPLETED | OUTPATIENT
Start: 2025-06-02 | End: 2025-06-02

## 2025-06-02 RX ORDER — FOLIC ACID 1 MG/1
1 TABLET ORAL ONCE
Status: COMPLETED | OUTPATIENT
Start: 2025-06-02 | End: 2025-06-02

## 2025-06-02 RX ADMIN — Medication 100 MG: at 15:31

## 2025-06-02 RX ADMIN — DIAZEPAM 10 MG: 10 INJECTION, SOLUTION INTRAMUSCULAR; INTRAVENOUS at 15:30

## 2025-06-02 RX ADMIN — LEVETIRACETAM 1000 MG: 10 INJECTION INTRAVENOUS at 16:08

## 2025-06-02 RX ADMIN — Medication 1 TABLET: at 15:31

## 2025-06-02 RX ADMIN — FOLIC ACID 1 MG: 1 TABLET ORAL at 15:31

## 2025-06-02 RX ADMIN — LEVETIRACETAM 1000 MG: 10 INJECTION INTRAVENOUS at 15:30

## 2025-06-02 ASSESSMENT — ACTIVITIES OF DAILY LIVING (ADL)
ADLS_ACUITY_SCORE: 41

## 2025-06-02 ASSESSMENT — LIFESTYLE VARIABLES
VISUAL DISTURBANCES: NOT PRESENT
TOTAL SCORE: 2
TREMOR: NOT VISIBLE, BUT CAN BE FELT FINGERTIP TO FINGERTIP
AGITATION: NORMAL ACTIVITY
AUDITORY DISTURBANCES: NOT PRESENT
ANXIETY: NO ANXIETY, AT EASE
ORIENTATION AND CLOUDING OF SENSORIUM: ORIENTED AND CAN DO SERIAL ADDITIONS
NAUSEA AND VOMITING: NO NAUSEA AND NO VOMITING
PAROXYSMAL SWEATS: BARELY PERCEPTIBLE SWEATING, PALMS MOIST
HEADACHE, FULLNESS IN HEAD: NOT PRESENT

## 2025-06-02 ASSESSMENT — ENCOUNTER SYMPTOMS
GASTROINTESTINAL NEGATIVE: 1
FEVER: 0
FATIGUE: 0
CARDIOVASCULAR NEGATIVE: 1
RESPIRATORY NEGATIVE: 1

## 2025-06-02 ASSESSMENT — COLUMBIA-SUICIDE SEVERITY RATING SCALE - C-SSRS
2. HAVE YOU ACTUALLY HAD ANY THOUGHTS OF KILLING YOURSELF IN THE PAST MONTH?: NO
1. IN THE PAST MONTH, HAVE YOU WISHED YOU WERE DEAD OR WISHED YOU COULD GO TO SLEEP AND NOT WAKE UP?: NO
6. HAVE YOU EVER DONE ANYTHING, STARTED TO DO ANYTHING, OR PREPARED TO DO ANYTHING TO END YOUR LIFE?: NO

## 2025-06-02 NOTE — ED PROVIDER NOTES
"  History     Chief Complaint   Patient presents with    Seizures     The history is provided by the patient.     Jarett Duenas is a 50 year old male who presented to the emergency department via EMS after a seizure.  The patient has a history of epilepsy and currently is taking gabapentin as well as Keppra for seizure control.  He also endorses rather routine alcohol abuse.  He does maintain a job.  He denies any drug use.  Denies any traumatic injury.  Seizure was noted while on his couch today.  No headaches.    Allergies:  No Known Allergies    Problem List:    Patient Active Problem List    Diagnosis Date Noted    CARDIOVASCULAR SCREENING; LDL GOAL LESS THAN 160 10/31/2010     Priority: Medium    Sprain of thoracic region 01/02/2006     Priority: Medium        Past Medical History:    Past Medical History:   Diagnosis Date    NO ACTIVE PROBLEMS        Past Surgical History:    No past surgical history on file.    Family History:    No family history on file.    Social History:  Marital Status:  Single [1]  Social History     Tobacco Use    Smoking status: Every Day     Current packs/day: 0.50     Average packs/day: 0.5 packs/day for 17.0 years (8.5 ttl pk-yrs)     Types: Cigarettes    Smokeless tobacco: Never   Substance Use Topics    Alcohol use: Yes     Comment: last drink; 0400    Drug use: Yes     Types: Marijuana     Comment: occasional        Medications:    cyclobenzaprine (FLEXERIL) 5 MG tablet  diazepam (VALIUM) 10 MG tablet  IBUPROFEN PO  levETIRAcetam (KEPPRA) 500 MG tablet  predniSONE (DELTASONE) 20 MG tablet          Review of Systems   Constitutional:  Negative for fatigue and fever.   Respiratory: Negative.     Cardiovascular: Negative.    Gastrointestinal: Negative.    Genitourinary: Negative.    Neurological:         See HPI       Physical Exam   BP: (!) 165/110  Pulse: 84  Temp: 98.2  F (36.8  C)  Resp: 18  Height: 180.3 cm (5' 11\")  Weight: 69.2 kg (152 lb 9.6 oz)  SpO2: 94 " %      Physical Exam  Vitals and nursing note reviewed.   Constitutional:       General: He is not in acute distress.     Appearance: Normal appearance. He is normal weight. He is not ill-appearing, toxic-appearing or diaphoretic.   HENT:      Head: Normocephalic and atraumatic.      Comments: No evidence of head injury     Mouth/Throat:      Comments: No evidence of tongue biting  Cardiovascular:      Rate and Rhythm: Normal rate and regular rhythm.   Pulmonary:      Effort: Pulmonary effort is normal.   Skin:     General: Skin is warm and dry.      Capillary Refill: Capillary refill takes less than 2 seconds.   Neurological:      General: No focal deficit present.      Mental Status: He is alert and oriented to person, place, and time.         ED Course     ED Course as of 06/02/25 1657   Mon Jun 02, 2025   1631 Magnesium: 1.7     Procedures              Critical Care time:  none     None         Results for orders placed or performed during the hospital encounter of 06/02/25 (from the past 24 hours)   CBC with platelets differential    Narrative    The following orders were created for panel order CBC with platelets differential.  Procedure                               Abnormality         Status                     ---------                               -----------         ------                     CBC with platelets and ...[[id number]]  Abnormal            Final result                 Please view results for these tests on the individual orders.   Comprehensive metabolic panel   Result Value Ref Range    Sodium 134 (L) 135 - 145 mmol/L    Potassium 4.0 3.4 - 5.3 mmol/L    Carbon Dioxide (CO2) 25 22 - 29 mmol/L    Anion Gap 12 7 - 15 mmol/L    Urea Nitrogen 13.8 6.0 - 20.0 mg/dL    Creatinine 0.71 0.67 - 1.17 mg/dL    GFR Estimate >90 >60 mL/min/1.73m2    Calcium 9.0 8.8 - 10.4 mg/dL    Chloride 97 (L) 98 - 107 mmol/L    Glucose 171 (H) 70 - 99 mg/dL    Alkaline Phosphatase 69 40 - 150 U/L    AST 36 0 - 45 U/L     ALT 30 0 - 70 U/L    Protein Total 6.8 6.4 - 8.3 g/dL    Albumin 4.2 3.5 - 5.2 g/dL    Bilirubin Total 0.5 <=1.2 mg/dL   Lactic acid whole blood with 1x repeat in 2 hr when >2   Result Value Ref Range    Lactic Acid, Initial 1.5 0.7 - 2.0 mmol/L   Magnesium   Result Value Ref Range    Magnesium 1.7 1.7 - 2.3 mg/dL   CBC with platelets and differential   Result Value Ref Range    WBC Count 9.1 4.0 - 11.0 10e3/uL    RBC Count 4.28 (L) 4.40 - 5.90 10e6/uL    Hemoglobin 13.7 13.3 - 17.7 g/dL    Hematocrit 39.6 (L) 40.0 - 53.0 %    MCV 93 78 - 100 fL    MCH 32.0 26.5 - 33.0 pg    MCHC 34.6 31.5 - 36.5 g/dL    RDW 15.3 (H) 10.0 - 15.0 %    Platelet Count 283 150 - 450 10e3/uL    % Neutrophils 86 %    % Lymphocytes 7 %    % Monocytes 6 %    % Eosinophils 0 %    % Basophils 1 %    % Immature Granulocytes 1 %    NRBCs per 100 WBC 0 <1 /100    Absolute Neutrophils 7.8 1.6 - 8.3 10e3/uL    Absolute Lymphocytes 0.7 (L) 0.8 - 5.3 10e3/uL    Absolute Monocytes 0.5 0.0 - 1.3 10e3/uL    Absolute Eosinophils 0.0 0.0 - 0.7 10e3/uL    Absolute Basophils 0.1 0.0 - 0.2 10e3/uL    Absolute Immature Granulocytes 0.1 <=0.4 10e3/uL    Absolute NRBCs 0.0 10e3/uL   Extra Tube    Narrative    The following orders were created for panel order Extra Tube.  Procedure                               Abnormality         Status                     ---------                               -----------         ------                     Extra Blue Top Tube[[id number]]                             In process                 Extra Red Top Tube[[id number]]                              In process                   Please view results for these tests on the individual orders.       Medications   diazepam (VALIUM) injection 10 mg (10 mg Intravenous $Given 6/2/25 3900)   levETIRAcetam (KEPPRA) intermittent infusion 1,000 mg (0 mg Intravenous Stopped 6/2/25 6438)     Followed by   levETIRAcetam (KEPPRA) intermittent infusion 1,000 mg (0 mg Intravenous Stopped  6/2/25 1633)   multivitamin w/minerals (THERA-VIT-M) tablet 1 tablet (1 tablet Oral $Given 6/2/25 1531)   thiamine tablet 100 mg (100 mg Oral $Given 6/2/25 1531)   folic acid (FOLVITE) tablet 1 mg (1 mg Oral $Given 6/2/25 1531)       Assessments & Plan (with Medical Decision Making)   50-year-old male with seizure of unknown etiology.  The patient does carry history of seizure disorder as well as alcohol use.  He has no significant tachycardia here.  He looks well.  He is well-kept and is pleasant and talkative.  He denies any hallucinations or other concerning features.  He was treated with the above medications including a load of his Keppra and IV diazepam.  He was observed in our emergency department for several hours and did well.  I discussed hospital admission.  He was quite adamant on discharge and absolutely refused any discussion of observation in the hospital for possible alcohol withdrawal seizure.  He has the capacity to make his own decisions.  Given this however, I did elect to send him home with a tapering schedule dose of diazepam as well as stressed importance of close clinic follow-up this week.  Mr. Duenas voiced complete understanding and was quite happy and agreeable with the plan.    This document was prepared using a combination of typing and voice generated software.  While every attempt was made for accuracy, spelling and grammatical errors may exist.     I have reviewed the nursing notes.    I have reviewed the findings, diagnosis, plan and need for follow up with the patient.           Medical Decision Making  The patient's presentation was of moderate complexity (an undiagnosed new problem with uncertain prognosis).    The patient's evaluation involved:  ordering and/or review of 3+ test(s) in this encounter (labs)    The patient's management necessitated moderate risk (prescription drug management including medications given in the ED) and high risk (a parenteral controlled  substance).        New Prescriptions    DIAZEPAM (VALIUM) 10 MG TABLET    1 tablet every 6 hours on day 1, 1 tablet every 8 hours on day 2, 1 tablet twice a day on day 3, 1 tablet on day 4       Final diagnoses:   Seizure (H)   Seizure disorder (H)   Alcohol abuse       6/2/2025   [facility]       Yuridia Oliveros PA-C  06/02/25 1700

## 2025-06-02 NOTE — DISCHARGE INSTRUCTIONS
Valium as prescribed.    Please follow-up in the clinic this week for recheck.    Continue your home seizure medications.    Please return to this emergency department for any other questions or concerns.

## 2025-06-02 NOTE — ED TRIAGE NOTES
Pt comes in with c/o witnessed seizure. Hx of daily drinker, pint of vodka over the weekend. Woke up today and decided he wanted to quit drinking. Last drink 2 nights ago.Does attend AA meetings. GCS 15.     Triage Assessment (Adult)       Row Name 06/02/25 1436          Triage Assessment    Airway WDL WDL        Respiratory WDL    Respiratory WDL WDL        Peripheral/Neurovascular WDL    Peripheral Neurovascular WDL WDL     Capillary Refill, General less than/equal to 3 secs

## 2025-07-18 ENCOUNTER — HOSPITAL ENCOUNTER (EMERGENCY)
Facility: HOSPITAL | Age: 50
Discharge: HOME | End: 2025-07-18
Attending: STUDENT IN AN ORGANIZED HEALTH CARE EDUCATION/TRAINING PROGRAM | Admitting: STUDENT IN AN ORGANIZED HEALTH CARE EDUCATION/TRAINING PROGRAM

## 2025-07-18 VITALS
DIASTOLIC BLOOD PRESSURE: 118 MMHG | OXYGEN SATURATION: 93 % | TEMPERATURE: 98 F | RESPIRATION RATE: 18 BRPM | BODY MASS INDEX: 21.47 KG/M2 | HEART RATE: 101 BPM | HEIGHT: 70 IN | SYSTOLIC BLOOD PRESSURE: 133 MMHG | WEIGHT: 150 LBS

## 2025-07-18 DIAGNOSIS — F10.10 ALCOHOL ABUSE: ICD-10-CM

## 2025-07-18 DIAGNOSIS — R56.9 SEIZURE (H): ICD-10-CM

## 2025-07-18 LAB
ANION GAP SERPL CALCULATED.3IONS-SCNC: 30 MMOL/L (ref 7–15)
BASOPHILS # BLD AUTO: 0.1 10E3/UL (ref 0–0.2)
BASOPHILS NFR BLD AUTO: 1 %
BUN SERPL-MCNC: 18 MG/DL (ref 6–20)
CALCIUM SERPL-MCNC: 9.6 MG/DL (ref 8.8–10.4)
CHLORIDE SERPL-SCNC: 94 MMOL/L (ref 98–107)
CREAT SERPL-MCNC: 1 MG/DL (ref 0.67–1.17)
EGFRCR SERPLBLD CKD-EPI 2021: >90 ML/MIN/1.73M2
EOSINOPHIL # BLD AUTO: 0 10E3/UL (ref 0–0.7)
EOSINOPHIL NFR BLD AUTO: 0 %
ERYTHROCYTE [DISTWIDTH] IN BLOOD BY AUTOMATED COUNT: 13.6 % (ref 10–15)
GLUCOSE BLDC GLUCOMTR-MCNC: 81 MG/DL (ref 70–99)
GLUCOSE SERPL-MCNC: 69 MG/DL (ref 70–99)
HCO3 SERPL-SCNC: 13 MMOL/L (ref 22–29)
HCT VFR BLD AUTO: 46.8 % (ref 40–53)
HGB BLD-MCNC: 16.2 G/DL (ref 13.3–17.7)
HOLD SPECIMEN: NORMAL
HOLD SPECIMEN: NORMAL
IMM GRANULOCYTES # BLD: 0 10E3/UL
IMM GRANULOCYTES NFR BLD: 0 %
LEVETIRACETAM SERPL-MCNC: 45.8 ÂΜG/ML (ref 10–40)
LYMPHOCYTES # BLD AUTO: 0.7 10E3/UL (ref 0.8–5.3)
LYMPHOCYTES NFR BLD AUTO: 6 %
MAGNESIUM SERPL-MCNC: 2.1 MG/DL (ref 1.7–2.3)
MCH RBC QN AUTO: 32.5 PG (ref 26.5–33)
MCHC RBC AUTO-ENTMCNC: 34.6 G/DL (ref 31.5–36.5)
MCV RBC AUTO: 94 FL (ref 78–100)
MONOCYTES # BLD AUTO: 0.5 10E3/UL (ref 0–1.3)
MONOCYTES NFR BLD AUTO: 5 %
NEUTROPHILS # BLD AUTO: 10.1 10E3/UL (ref 1.6–8.3)
NEUTROPHILS NFR BLD AUTO: 89 %
NRBC # BLD AUTO: 0 10E3/UL
NRBC BLD AUTO-RTO: 0 /100
PLATELET # BLD AUTO: 254 10E3/UL (ref 150–450)
POTASSIUM SERPL-SCNC: 4.6 MMOL/L (ref 3.4–5.3)
RBC # BLD AUTO: 4.99 10E6/UL (ref 4.4–5.9)
SODIUM SERPL-SCNC: 137 MMOL/L (ref 135–145)
WBC # BLD AUTO: 11.4 10E3/UL (ref 4–11)

## 2025-07-18 PROCEDURE — 80171 DRUG SCREEN QUANT GABAPENTIN: CPT | Performed by: STUDENT IN AN ORGANIZED HEALTH CARE EDUCATION/TRAINING PROGRAM

## 2025-07-18 PROCEDURE — 80048 BASIC METABOLIC PNL TOTAL CA: CPT | Performed by: STUDENT IN AN ORGANIZED HEALTH CARE EDUCATION/TRAINING PROGRAM

## 2025-07-18 PROCEDURE — 99284 EMERGENCY DEPT VISIT MOD MDM: CPT | Mod: 25 | Performed by: STUDENT IN AN ORGANIZED HEALTH CARE EDUCATION/TRAINING PROGRAM

## 2025-07-18 PROCEDURE — 80177 DRUG SCRN QUAN LEVETIRACETAM: CPT | Performed by: STUDENT IN AN ORGANIZED HEALTH CARE EDUCATION/TRAINING PROGRAM

## 2025-07-18 PROCEDURE — 250N000011 HC RX IP 250 OP 636: Performed by: STUDENT IN AN ORGANIZED HEALTH CARE EDUCATION/TRAINING PROGRAM

## 2025-07-18 PROCEDURE — 83735 ASSAY OF MAGNESIUM: CPT | Performed by: STUDENT IN AN ORGANIZED HEALTH CARE EDUCATION/TRAINING PROGRAM

## 2025-07-18 PROCEDURE — 99284 EMERGENCY DEPT VISIT MOD MDM: CPT | Performed by: STUDENT IN AN ORGANIZED HEALTH CARE EDUCATION/TRAINING PROGRAM

## 2025-07-18 PROCEDURE — 82962 GLUCOSE BLOOD TEST: CPT

## 2025-07-18 PROCEDURE — 85025 COMPLETE CBC W/AUTO DIFF WBC: CPT | Performed by: STUDENT IN AN ORGANIZED HEALTH CARE EDUCATION/TRAINING PROGRAM

## 2025-07-18 PROCEDURE — 36415 COLL VENOUS BLD VENIPUNCTURE: CPT | Performed by: STUDENT IN AN ORGANIZED HEALTH CARE EDUCATION/TRAINING PROGRAM

## 2025-07-18 PROCEDURE — 96365 THER/PROPH/DIAG IV INF INIT: CPT

## 2025-07-18 PROCEDURE — 250N000013 HC RX MED GY IP 250 OP 250 PS 637: Performed by: STUDENT IN AN ORGANIZED HEALTH CARE EDUCATION/TRAINING PROGRAM

## 2025-07-18 RX ORDER — DIAZEPAM 5 MG/1
10 TABLET ORAL ONCE
Status: COMPLETED | OUTPATIENT
Start: 2025-07-18 | End: 2025-07-18

## 2025-07-18 RX ORDER — LEVETIRACETAM 15 MG/ML
1500 INJECTION INTRAVASCULAR ONCE
Status: COMPLETED | OUTPATIENT
Start: 2025-07-18 | End: 2025-07-18

## 2025-07-18 RX ORDER — GABAPENTIN 300 MG/1
900 CAPSULE ORAL ONCE
Status: COMPLETED | OUTPATIENT
Start: 2025-07-18 | End: 2025-07-18

## 2025-07-18 RX ORDER — GABAPENTIN 300 MG/1
600 CAPSULE ORAL 3 TIMES DAILY
Qty: 180 CAPSULE | Refills: 0 | Status: SHIPPED | OUTPATIENT
Start: 2025-07-18 | End: 2025-08-17

## 2025-07-18 RX ADMIN — GABAPENTIN 900 MG: 300 CAPSULE ORAL at 09:15

## 2025-07-18 RX ADMIN — LEVETIRACETAM 1500 MG: 15 INJECTION INTRAVENOUS at 08:50

## 2025-07-18 RX ADMIN — DIAZEPAM 10 MG: 5 TABLET ORAL at 09:15

## 2025-07-18 ASSESSMENT — ACTIVITIES OF DAILY LIVING (ADL)
ADLS_ACUITY_SCORE: 41
ADLS_ACUITY_SCORE: 41

## 2025-07-18 ASSESSMENT — COLUMBIA-SUICIDE SEVERITY RATING SCALE - C-SSRS
2. HAVE YOU ACTUALLY HAD ANY THOUGHTS OF KILLING YOURSELF IN THE PAST MONTH?: NO
6. HAVE YOU EVER DONE ANYTHING, STARTED TO DO ANYTHING, OR PREPARED TO DO ANYTHING TO END YOUR LIFE?: NO
1. IN THE PAST MONTH, HAVE YOU WISHED YOU WERE DEAD OR WISHED YOU COULD GO TO SLEEP AND NOT WAKE UP?: NO

## 2025-07-20 LAB — GABAPENTIN SERPLBLD-MCNC: <0.8 UG/ML
